# Patient Record
Sex: FEMALE | Race: WHITE | NOT HISPANIC OR LATINO | URBAN - METROPOLITAN AREA
[De-identification: names, ages, dates, MRNs, and addresses within clinical notes are randomized per-mention and may not be internally consistent; named-entity substitution may affect disease eponyms.]

---

## 2018-01-23 ENCOUNTER — OUTPATIENT (OUTPATIENT)
Dept: OUTPATIENT SERVICES | Facility: HOSPITAL | Age: 48
LOS: 1 days | Discharge: HOME | End: 2018-01-23

## 2018-01-23 DIAGNOSIS — Z01.818 ENCOUNTER FOR OTHER PREPROCEDURAL EXAMINATION: ICD-10-CM

## 2018-01-23 DIAGNOSIS — G56.02 CARPAL TUNNEL SYNDROME, LEFT UPPER LIMB: ICD-10-CM

## 2018-01-24 DIAGNOSIS — F17.200 NICOTINE DEPENDENCE, UNSPECIFIED, UNCOMPLICATED: ICD-10-CM

## 2018-01-24 DIAGNOSIS — I10 ESSENTIAL (PRIMARY) HYPERTENSION: ICD-10-CM

## 2018-01-24 DIAGNOSIS — E01.8 OTHER IODINE-DEFICIENCY RELATED THYROID DISORDERS AND ALLIED CONDITIONS: ICD-10-CM

## 2018-02-01 ENCOUNTER — OUTPATIENT (OUTPATIENT)
Dept: OUTPATIENT SERVICES | Facility: HOSPITAL | Age: 48
LOS: 1 days | Discharge: HOME | End: 2018-02-01

## 2018-02-07 DIAGNOSIS — Z91.040 LATEX ALLERGY STATUS: ICD-10-CM

## 2018-02-07 DIAGNOSIS — I10 ESSENTIAL (PRIMARY) HYPERTENSION: ICD-10-CM

## 2018-02-07 DIAGNOSIS — G56.02 CARPAL TUNNEL SYNDROME, LEFT UPPER LIMB: ICD-10-CM

## 2018-03-01 ENCOUNTER — OUTPATIENT (OUTPATIENT)
Dept: OUTPATIENT SERVICES | Facility: HOSPITAL | Age: 48
LOS: 1 days | Discharge: HOME | End: 2018-03-01

## 2018-03-01 VITALS
HEART RATE: 70 BPM | RESPIRATION RATE: 20 BRPM | DIASTOLIC BLOOD PRESSURE: 87 MMHG | OXYGEN SATURATION: 98 % | SYSTOLIC BLOOD PRESSURE: 146 MMHG

## 2018-03-01 VITALS
HEART RATE: 81 BPM | TEMPERATURE: 99 F | HEIGHT: 67 IN | DIASTOLIC BLOOD PRESSURE: 71 MMHG | RESPIRATION RATE: 18 BRPM | WEIGHT: 199.96 LBS | OXYGEN SATURATION: 99 % | SYSTOLIC BLOOD PRESSURE: 129 MMHG

## 2018-03-01 DIAGNOSIS — Z98.890 OTHER SPECIFIED POSTPROCEDURAL STATES: Chronic | ICD-10-CM

## 2018-03-01 DIAGNOSIS — Z90.49 ACQUIRED ABSENCE OF OTHER SPECIFIED PARTS OF DIGESTIVE TRACT: Chronic | ICD-10-CM

## 2018-03-01 RX ORDER — ONDANSETRON 8 MG/1
4 TABLET, FILM COATED ORAL ONCE
Qty: 0 | Refills: 0 | Status: DISCONTINUED | OUTPATIENT
Start: 2018-03-01 | End: 2018-03-16

## 2018-03-01 RX ORDER — ACETAMINOPHEN 500 MG
975 TABLET ORAL ONCE
Qty: 0 | Refills: 0 | Status: DISCONTINUED | OUTPATIENT
Start: 2018-03-01 | End: 2018-03-16

## 2018-03-01 RX ORDER — SODIUM CHLORIDE 9 MG/ML
1000 INJECTION, SOLUTION INTRAVENOUS
Qty: 0 | Refills: 0 | Status: DISCONTINUED | OUTPATIENT
Start: 2018-03-01 | End: 2018-03-16

## 2018-03-01 RX ORDER — OXYCODONE AND ACETAMINOPHEN 5; 325 MG/1; MG/1
1 TABLET ORAL EVERY 4 HOURS
Qty: 0 | Refills: 0 | Status: DISCONTINUED | OUTPATIENT
Start: 2018-03-01 | End: 2018-03-01

## 2018-03-01 RX ORDER — HYDROMORPHONE HYDROCHLORIDE 2 MG/ML
1 INJECTION INTRAMUSCULAR; INTRAVENOUS; SUBCUTANEOUS
Qty: 0 | Refills: 0 | Status: DISCONTINUED | OUTPATIENT
Start: 2018-03-01 | End: 2018-03-01

## 2018-03-01 RX ORDER — HYDROMORPHONE HYDROCHLORIDE 2 MG/ML
0.5 INJECTION INTRAMUSCULAR; INTRAVENOUS; SUBCUTANEOUS
Qty: 0 | Refills: 0 | Status: DISCONTINUED | OUTPATIENT
Start: 2018-03-01 | End: 2018-03-01

## 2018-03-01 RX ORDER — MEPERIDINE HYDROCHLORIDE 50 MG/ML
12.5 INJECTION INTRAMUSCULAR; INTRAVENOUS; SUBCUTANEOUS ONCE
Qty: 0 | Refills: 0 | Status: DISCONTINUED | OUTPATIENT
Start: 2018-03-01 | End: 2018-03-01

## 2018-03-01 RX ADMIN — SODIUM CHLORIDE 100 MILLILITER(S): 9 INJECTION, SOLUTION INTRAVENOUS at 14:14

## 2018-03-01 NOTE — ASU DISCHARGE PLAN (ADULT/PEDIATRIC). - SPECIAL INSTRUCTIONS
DIET:    Resume normal diet  No alcoholic  beverages for 24 hours or if on prescribed narcotic pain medications.    MEDICATION:    Resume your preoperative oral medications.  Check with your physician before starting aspirin, Coumadin, or other blood thinners.  Prescriptions given to you - take as directed.      ACTIVITY:    Rest today and limit your activities for 24 hours.  Do not drive or operate machinery for 24 hours - if you received anesthesia.  When taking pain medication, be careful as you walk or climb stairs.  It is not advisable to drive while taking prescribed pain medication.    SPECIAL INSTRUCTIONS:    __x___ Elevate operative site above heart level or as directed.  _____ Apply ice to operative site as directed.  _____ Use  sling as directed.  __x___ Exercise fingers.    DRESSING CARE:    _____ You may change the dressing 4 days. Keep wound covered with band-aids.  __x___ Do not change the dressing until your doctor see you.  __x___ You can loosen or rewrap the dressing.  __x___  Keep dressing clean and dry.  __x___ You may shower in _1____ day(s) with the extremity covered by a plastic bag.  _____ OK to wash hand , including showers, in _____ day(s).    ADDITIONAL  INFORMATION:    Post operative visit should be scheduled for next week.  If you are not aware of visit please contact office.  If you have any questions or concerns call office at      Notify your doctor if you develop   Fever  Excessive Swelling  Chills   Drainage   Pain not controlled by medication  Persistent numbness in hand or fingers    If an Emergency arises call 911 and/or go to the Emergency Room

## 2018-03-01 NOTE — BRIEF OPERATIVE NOTE - PROCEDURE
<<-----Click on this checkbox to enter Procedure Open release of right carpal tunnel  03/01/2018    Active  [id number]

## 2018-03-05 DIAGNOSIS — G56.01 CARPAL TUNNEL SYNDROME, RIGHT UPPER LIMB: ICD-10-CM

## 2018-05-22 ENCOUNTER — TRANSCRIPTION ENCOUNTER (OUTPATIENT)
Age: 48
End: 2018-05-22

## 2019-06-19 ENCOUNTER — EMERGENCY (EMERGENCY)
Facility: HOSPITAL | Age: 49
LOS: 0 days | Discharge: HOME | End: 2019-06-20
Attending: EMERGENCY MEDICINE | Admitting: EMERGENCY MEDICINE
Payer: COMMERCIAL

## 2019-06-19 VITALS
SYSTOLIC BLOOD PRESSURE: 141 MMHG | HEART RATE: 99 BPM | DIASTOLIC BLOOD PRESSURE: 94 MMHG | RESPIRATION RATE: 18 BRPM | OXYGEN SATURATION: 97 % | TEMPERATURE: 99 F

## 2019-06-19 DIAGNOSIS — Z98.890 OTHER SPECIFIED POSTPROCEDURAL STATES: Chronic | ICD-10-CM

## 2019-06-19 DIAGNOSIS — R42 DIZZINESS AND GIDDINESS: ICD-10-CM

## 2019-06-19 DIAGNOSIS — I10 ESSENTIAL (PRIMARY) HYPERTENSION: ICD-10-CM

## 2019-06-19 DIAGNOSIS — R07.9 CHEST PAIN, UNSPECIFIED: ICD-10-CM

## 2019-06-19 DIAGNOSIS — Z90.49 ACQUIRED ABSENCE OF OTHER SPECIFIED PARTS OF DIGESTIVE TRACT: Chronic | ICD-10-CM

## 2019-06-19 DIAGNOSIS — E78.5 HYPERLIPIDEMIA, UNSPECIFIED: ICD-10-CM

## 2019-06-19 DIAGNOSIS — R07.89 OTHER CHEST PAIN: ICD-10-CM

## 2019-06-19 DIAGNOSIS — Z87.891 PERSONAL HISTORY OF NICOTINE DEPENDENCE: ICD-10-CM

## 2019-06-19 DIAGNOSIS — Z82.49 FAMILY HISTORY OF ISCHEMIC HEART DISEASE AND OTHER DISEASES OF THE CIRCULATORY SYSTEM: ICD-10-CM

## 2019-06-19 LAB
ALBUMIN SERPL ELPH-MCNC: 4.1 G/DL — SIGNIFICANT CHANGE UP (ref 3.5–5.2)
ALP SERPL-CCNC: 98 U/L — SIGNIFICANT CHANGE UP (ref 30–115)
ALT FLD-CCNC: 10 U/L — SIGNIFICANT CHANGE UP (ref 0–41)
ANION GAP SERPL CALC-SCNC: 16 MMOL/L — HIGH (ref 7–14)
AST SERPL-CCNC: 16 U/L — SIGNIFICANT CHANGE UP (ref 0–41)
BASOPHILS # BLD AUTO: 0.04 K/UL — SIGNIFICANT CHANGE UP (ref 0–0.2)
BASOPHILS NFR BLD AUTO: 0.6 % — SIGNIFICANT CHANGE UP (ref 0–1)
BILIRUB SERPL-MCNC: <0.2 MG/DL — SIGNIFICANT CHANGE UP (ref 0.2–1.2)
BUN SERPL-MCNC: 15 MG/DL — SIGNIFICANT CHANGE UP (ref 10–20)
CALCIUM SERPL-MCNC: 9.3 MG/DL — SIGNIFICANT CHANGE UP (ref 8.5–10.1)
CHLORIDE SERPL-SCNC: 100 MMOL/L — SIGNIFICANT CHANGE UP (ref 98–110)
CO2 SERPL-SCNC: 22 MMOL/L — SIGNIFICANT CHANGE UP (ref 17–32)
CREAT SERPL-MCNC: 0.9 MG/DL — SIGNIFICANT CHANGE UP (ref 0.7–1.5)
D DIMER BLD IA.RAPID-MCNC: 401 NG/ML DDU — HIGH (ref 0–230)
EOSINOPHIL # BLD AUTO: 0.01 K/UL — SIGNIFICANT CHANGE UP (ref 0–0.7)
EOSINOPHIL NFR BLD AUTO: 0.1 % — SIGNIFICANT CHANGE UP (ref 0–8)
GLUCOSE SERPL-MCNC: 92 MG/DL — SIGNIFICANT CHANGE UP (ref 70–99)
HCT VFR BLD CALC: 40.9 % — SIGNIFICANT CHANGE UP (ref 37–47)
HGB BLD-MCNC: 13.5 G/DL — SIGNIFICANT CHANGE UP (ref 12–16)
IMM GRANULOCYTES NFR BLD AUTO: 0.3 % — SIGNIFICANT CHANGE UP (ref 0.1–0.3)
LYMPHOCYTES # BLD AUTO: 0.79 K/UL — LOW (ref 1.2–3.4)
LYMPHOCYTES # BLD AUTO: 11.7 % — LOW (ref 20.5–51.1)
MCHC RBC-ENTMCNC: 31.3 PG — HIGH (ref 27–31)
MCHC RBC-ENTMCNC: 33 G/DL — SIGNIFICANT CHANGE UP (ref 32–37)
MCV RBC AUTO: 94.7 FL — SIGNIFICANT CHANGE UP (ref 81–99)
MONOCYTES # BLD AUTO: 0.97 K/UL — HIGH (ref 0.1–0.6)
MONOCYTES NFR BLD AUTO: 14.3 % — HIGH (ref 1.7–9.3)
NEUTROPHILS # BLD AUTO: 4.94 K/UL — SIGNIFICANT CHANGE UP (ref 1.4–6.5)
NEUTROPHILS NFR BLD AUTO: 73 % — SIGNIFICANT CHANGE UP (ref 42.2–75.2)
NRBC # BLD: 0 /100 WBCS — SIGNIFICANT CHANGE UP (ref 0–0)
PLATELET # BLD AUTO: 257 K/UL — SIGNIFICANT CHANGE UP (ref 130–400)
POTASSIUM SERPL-MCNC: 4.4 MMOL/L — SIGNIFICANT CHANGE UP (ref 3.5–5)
POTASSIUM SERPL-SCNC: 4.4 MMOL/L — SIGNIFICANT CHANGE UP (ref 3.5–5)
PROT SERPL-MCNC: 7.2 G/DL — SIGNIFICANT CHANGE UP (ref 6–8)
RBC # BLD: 4.32 M/UL — SIGNIFICANT CHANGE UP (ref 4.2–5.4)
RBC # FLD: 13.6 % — SIGNIFICANT CHANGE UP (ref 11.5–14.5)
SODIUM SERPL-SCNC: 138 MMOL/L — SIGNIFICANT CHANGE UP (ref 135–146)
TROPONIN T SERPL-MCNC: <0.01 NG/ML — SIGNIFICANT CHANGE UP
WBC # BLD: 6.77 K/UL — SIGNIFICANT CHANGE UP (ref 4.8–10.8)
WBC # FLD AUTO: 6.77 K/UL — SIGNIFICANT CHANGE UP (ref 4.8–10.8)

## 2019-06-19 PROCEDURE — 71045 X-RAY EXAM CHEST 1 VIEW: CPT | Mod: 26

## 2019-06-19 PROCEDURE — 93010 ELECTROCARDIOGRAM REPORT: CPT

## 2019-06-19 PROCEDURE — 99220: CPT

## 2019-06-19 RX ORDER — ASPIRIN/CALCIUM CARB/MAGNESIUM 324 MG
162 TABLET ORAL ONCE
Refills: 0 | Status: COMPLETED | OUTPATIENT
Start: 2019-06-19 | End: 2019-06-19

## 2019-06-19 RX ADMIN — Medication 162 MILLIGRAM(S): at 21:25

## 2019-06-19 NOTE — ED PROVIDER NOTE - INPATIENT RESIDENT/ACP NOTIFIED DATE
----- Message from Lisa Weiner sent at 10/15/2018  8:07 AM CDT -----  Contact: Patient  Needs Advice    Reason for call: Patient has concerns about his low potassium levels. He wishes to speak to a nurse before going have his labs drawn about a possible 2nd drawing, while he's there.         Communication Preference: [phone]    Additional Information: n/a     19-Jun-2019 22:50

## 2019-06-19 NOTE — ED CDU PROVIDER INITIAL DAY NOTE - OBJECTIVE STATEMENT
48y/o female with pmh of htn, hld, pt. c/o lightheadedness and cp associated with sob. cp radiates to the neck on both sides. . pt. denies fever, chills, cough, vomiting, abdominal pain, leg pain/swelling, recent travel. pt. states she had mmr booster 10 days ago. last stress test was 3 years ago with dr. Yang. + fhx of cad. ex smoker- quit 1 year ago.

## 2019-06-19 NOTE — ED PROVIDER NOTE - OBJECTIVE STATEMENT
48 yo f with hypothyroidism, former dyslipidemia, and htn (no longer on meds per recent pmd visit), c/o cp since this afternoon, rad to neck, left back/shoulder.  assoc with sob and pleuritic cp. has chronic right leg pain due to knee surgery, but no swelling/calf or groin pain.  active smoker. +fam hx of cad (siblings with cabg in 50s.

## 2019-06-19 NOTE — ED CDU PROVIDER INITIAL DAY NOTE - NEURO NEGATIVE STATEMENT, MLM
no loss of consciousness, no gait abnormality, no headache, no sensory deficits, and no weakness. + lightheadedness

## 2019-06-19 NOTE — ED CDU PROVIDER INITIAL DAY NOTE - ATTENDING CONTRIBUTION TO CARE
50yo woman h/o HTN, HLD, smoker was placed in CDU for workup of chest discomfort into her neck. ED eval with EKG, labs and CTA were unremarkable. VS, exam as noted, pt extremely anxious and notes that she is under a lot of stress, had been started on SSRI with PMD but went off voluntarily because it didn't seem to helping. Plan is for Monitoring, serial EKG and enzymes, nuclear stress.

## 2019-06-19 NOTE — ED CDU PROVIDER INITIAL DAY NOTE - MEDICAL DECISION MAKING DETAILS
48yo woman h/o HTN, HLD, smoker was placed in CDU for workup of chest discomfort into her neck. ED eval with EKG, labs and CTA were unremarkable. VS, exam as noted, pt extremely anxious and notes that she is under a lot of stress, had been started on SSRI with PMD but went off voluntarily because it didn't seem to helping. Plan is for Monitoring, serial EKG and enzymes, nuclear stress.

## 2019-06-20 VITALS
RESPIRATION RATE: 18 BRPM | OXYGEN SATURATION: 98 % | TEMPERATURE: 98 F | DIASTOLIC BLOOD PRESSURE: 98 MMHG | HEART RATE: 80 BPM | SYSTOLIC BLOOD PRESSURE: 157 MMHG

## 2019-06-20 PROBLEM — E03.9 HYPOTHYROIDISM, UNSPECIFIED: Chronic | Status: ACTIVE | Noted: 2018-03-01

## 2019-06-20 LAB
HCG SERPL-ACNC: <0.6 MIU/ML — SIGNIFICANT CHANGE UP
TROPONIN T SERPL-MCNC: <0.01 NG/ML — SIGNIFICANT CHANGE UP

## 2019-06-20 PROCEDURE — 71275 CT ANGIOGRAPHY CHEST: CPT | Mod: 26

## 2019-06-20 PROCEDURE — 78452 HT MUSCLE IMAGE SPECT MULT: CPT | Mod: 26

## 2019-06-20 PROCEDURE — 99217: CPT

## 2019-06-20 PROCEDURE — 93018 CV STRESS TEST I&R ONLY: CPT

## 2019-06-20 PROCEDURE — 93016 CV STRESS TEST SUPVJ ONLY: CPT

## 2019-06-20 NOTE — ED ADULT NURSE REASSESSMENT NOTE - NS ED NURSE REASSESS COMMENT FT1
patient assessed vss. patient updated on plan understanding was verbalized. Patient states initial symptoms of cp have subsided and were not present overnight.

## 2019-06-20 NOTE — ED CDU PROVIDER DISPOSITION NOTE - CARE PROVIDER_API CALL
Jose Yang)  Cardiovascular Disease; Internal Medicine  [address] [place] [address]  Phone: [phone]  Fax: [phone]  Follow Up Time:

## 2019-06-20 NOTE — ED CDU PROVIDER SUBSEQUENT DAY NOTE - PROGRESS NOTE DETAILS
Patient with unremarkable nuclear stress. Discussed all imaging and labs with patient. Patient reports significant FHx of CAD, ex-smoker. Advised to f/u with cardio outpatient. Patient also notes increased stress load in personal life and has been attributing symptoms to such. Was previously on paxil but discontinued due to GI upset. Will provide pt with outpatient mental health

## 2019-06-20 NOTE — ED CDU PROVIDER SUBSEQUENT DAY NOTE - MEDICAL DECISION MAKING DETAILS
48yo woman h/o HTN, HLD, smoker was placed in CDU for workup of chest discomfort into her neck. ED eval with EKG, labs and CTA were unremarkable. VS, exam as noted, pt extremely anxious and notes that she is under a lot of stress, had been started on SSRI with PMD but went off voluntarily because it didn't seem to helping. Pt monitored without incident, repeat EKG and enzymes unchanged. Awaiting exercise nuc.

## 2019-06-20 NOTE — ED CDU PROVIDER DISPOSITION NOTE - ATTENDING CONTRIBUTION TO CARE
50yo woman h/o HTN, HLD, smoker was placed in CDU for workup of chest discomfort into her neck. ED eval with EKG, labs and CTA were unremarkable. VS, exam as noted, pt extremely anxious and notes that she is under a lot of stress, had been started on SSRI with PMD but went off voluntarily because it didn't seem to helping. Pt monitored without incident, repeat EKG and enzymes unchanged. Pt tolerated exercise nuc without difficulty; it was negative for ischemia. I spoke extensively with pt about her stress as I feel she is symptomatic from anxiety--however she does have a strong FH of CAD, rec f/u with Dr Yang and with outpt mental health services. Pt amenable to discharge and will f/u.

## 2019-06-20 NOTE — ED CDU PROVIDER DISPOSITION NOTE - CLINICAL COURSE
48yo woman h/o HTN, HLD, smoker was placed in CDU for workup of chest discomfort into her neck. ED eval with EKG, labs and CTA were unremarkable. VS, exam as noted, pt extremely anxious and notes that she is under a lot of stress, had been started on SSRI with PMD but went off voluntarily because it didn't seem to helping. Pt monitored without incident, repeat EKG and enzymes unchanged. Pt tolerated exercise nuc without difficulty; it was negative for ischemia. I spoke extensively with pt about her stress as I feel she is symptomatic from anxiety--however she does have a strong FH of CAD, rec f/u with Dr Yang and with outpt mental health services. Pt amenable to discharge and will f/u.

## 2019-06-20 NOTE — ED CDU PROVIDER DISPOSITION NOTE - NSFOLLOWUPCLINICS_GEN_ALL_ED_FT
[facility]  OP Mental Health  [address] [place] [id number]  Phone: [phone]  Fax:   Follow Up Time:

## 2019-09-11 ENCOUNTER — TRANSCRIPTION ENCOUNTER (OUTPATIENT)
Age: 49
End: 2019-09-11

## 2019-11-08 NOTE — ASU PATIENT PROFILE, ADULT - CIGARETTES, NUMBER OF YRS
Yarsani/ethnic/cultural/personal food preferences/acute on chronic comorbidities, ESRD on HD acute on chronic comorbidities, ESRD on HD/Mosque/ethnic/cultural/personal food preferences/developmental delay 25

## 2020-09-10 ENCOUNTER — TRANSCRIPTION ENCOUNTER (OUTPATIENT)
Age: 50
End: 2020-09-10

## 2020-10-02 ENCOUNTER — EMERGENCY (EMERGENCY)
Facility: HOSPITAL | Age: 50
LOS: 0 days | Discharge: HOME | End: 2020-10-03
Attending: EMERGENCY MEDICINE | Admitting: EMERGENCY MEDICINE
Payer: COMMERCIAL

## 2020-10-02 ENCOUNTER — TRANSCRIPTION ENCOUNTER (OUTPATIENT)
Age: 50
End: 2020-10-02

## 2020-10-02 VITALS
OXYGEN SATURATION: 96 % | HEART RATE: 74 BPM | RESPIRATION RATE: 16 BRPM | TEMPERATURE: 98 F | DIASTOLIC BLOOD PRESSURE: 98 MMHG | WEIGHT: 220.02 LBS | HEIGHT: 67 IN | SYSTOLIC BLOOD PRESSURE: 174 MMHG

## 2020-10-02 DIAGNOSIS — H57.89 OTHER SPECIFIED DISORDERS OF EYE AND ADNEXA: ICD-10-CM

## 2020-10-02 DIAGNOSIS — Z88.1 ALLERGY STATUS TO OTHER ANTIBIOTIC AGENTS STATUS: ICD-10-CM

## 2020-10-02 DIAGNOSIS — Z98.890 OTHER SPECIFIED POSTPROCEDURAL STATES: Chronic | ICD-10-CM

## 2020-10-02 DIAGNOSIS — Z79.899 OTHER LONG TERM (CURRENT) DRUG THERAPY: ICD-10-CM

## 2020-10-02 DIAGNOSIS — I10 ESSENTIAL (PRIMARY) HYPERTENSION: ICD-10-CM

## 2020-10-02 DIAGNOSIS — E03.9 HYPOTHYROIDISM, UNSPECIFIED: ICD-10-CM

## 2020-10-02 DIAGNOSIS — R51.9 HEADACHE, UNSPECIFIED: ICD-10-CM

## 2020-10-02 DIAGNOSIS — Z90.49 ACQUIRED ABSENCE OF OTHER SPECIFIED PARTS OF DIGESTIVE TRACT: Chronic | ICD-10-CM

## 2020-10-02 DIAGNOSIS — Z79.891 LONG TERM (CURRENT) USE OF OPIATE ANALGESIC: ICD-10-CM

## 2020-10-02 LAB
ANION GAP SERPL CALC-SCNC: 12 MMOL/L — SIGNIFICANT CHANGE UP (ref 7–14)
BASOPHILS # BLD AUTO: 0.07 K/UL — SIGNIFICANT CHANGE UP (ref 0–0.2)
BASOPHILS NFR BLD AUTO: 0.4 % — SIGNIFICANT CHANGE UP (ref 0–1)
BUN SERPL-MCNC: 16 MG/DL — SIGNIFICANT CHANGE UP (ref 10–20)
CALCIUM SERPL-MCNC: 9.2 MG/DL — SIGNIFICANT CHANGE UP (ref 8.5–10.1)
CHLORIDE SERPL-SCNC: 106 MMOL/L — SIGNIFICANT CHANGE UP (ref 98–110)
CO2 SERPL-SCNC: 24 MMOL/L — SIGNIFICANT CHANGE UP (ref 17–32)
CREAT SERPL-MCNC: 1 MG/DL — SIGNIFICANT CHANGE UP (ref 0.7–1.5)
EOSINOPHIL # BLD AUTO: 0.13 K/UL — SIGNIFICANT CHANGE UP (ref 0–0.7)
EOSINOPHIL NFR BLD AUTO: 0.8 % — SIGNIFICANT CHANGE UP (ref 0–8)
GLUCOSE SERPL-MCNC: 124 MG/DL — HIGH (ref 70–99)
HCG SERPL QL: NEGATIVE — SIGNIFICANT CHANGE UP
HCT VFR BLD CALC: 40.7 % — SIGNIFICANT CHANGE UP (ref 37–47)
HGB BLD-MCNC: 13.2 G/DL — SIGNIFICANT CHANGE UP (ref 12–16)
IMM GRANULOCYTES NFR BLD AUTO: 0.4 % — HIGH (ref 0.1–0.3)
LYMPHOCYTES # BLD AUTO: 15.3 % — LOW (ref 20.5–51.1)
LYMPHOCYTES # BLD AUTO: 2.43 K/UL — SIGNIFICANT CHANGE UP (ref 1.2–3.4)
MCHC RBC-ENTMCNC: 29.9 PG — SIGNIFICANT CHANGE UP (ref 27–31)
MCHC RBC-ENTMCNC: 32.4 G/DL — SIGNIFICANT CHANGE UP (ref 32–37)
MCV RBC AUTO: 92.3 FL — SIGNIFICANT CHANGE UP (ref 81–99)
MONOCYTES # BLD AUTO: 1.21 K/UL — HIGH (ref 0.1–0.6)
MONOCYTES NFR BLD AUTO: 7.6 % — SIGNIFICANT CHANGE UP (ref 1.7–9.3)
NEUTROPHILS # BLD AUTO: 12.01 K/UL — HIGH (ref 1.4–6.5)
NEUTROPHILS NFR BLD AUTO: 75.5 % — HIGH (ref 42.2–75.2)
NRBC # BLD: 0 /100 WBCS — SIGNIFICANT CHANGE UP (ref 0–0)
PLATELET # BLD AUTO: 312 K/UL — SIGNIFICANT CHANGE UP (ref 130–400)
POTASSIUM SERPL-MCNC: 4.1 MMOL/L — SIGNIFICANT CHANGE UP (ref 3.5–5)
POTASSIUM SERPL-SCNC: 4.1 MMOL/L — SIGNIFICANT CHANGE UP (ref 3.5–5)
RBC # BLD: 4.41 M/UL — SIGNIFICANT CHANGE UP (ref 4.2–5.4)
RBC # FLD: 12.4 % — SIGNIFICANT CHANGE UP (ref 11.5–14.5)
SODIUM SERPL-SCNC: 142 MMOL/L — SIGNIFICANT CHANGE UP (ref 135–146)
WBC # BLD: 15.92 K/UL — HIGH (ref 4.8–10.8)
WBC # FLD AUTO: 15.92 K/UL — HIGH (ref 4.8–10.8)

## 2020-10-02 PROCEDURE — 99285 EMERGENCY DEPT VISIT HI MDM: CPT

## 2020-10-02 RX ORDER — SODIUM CHLORIDE 9 MG/ML
1000 INJECTION, SOLUTION INTRAVENOUS ONCE
Refills: 0 | Status: COMPLETED | OUTPATIENT
Start: 2020-10-02 | End: 2020-10-02

## 2020-10-02 RX ORDER — ACETAMINOPHEN 500 MG
650 TABLET ORAL ONCE
Refills: 0 | Status: COMPLETED | OUTPATIENT
Start: 2020-10-02 | End: 2020-10-02

## 2020-10-02 RX ORDER — METOCLOPRAMIDE HCL 10 MG
10 TABLET ORAL ONCE
Refills: 0 | Status: COMPLETED | OUTPATIENT
Start: 2020-10-02 | End: 2020-10-02

## 2020-10-02 RX ADMIN — SODIUM CHLORIDE 1000 MILLILITER(S): 9 INJECTION, SOLUTION INTRAVENOUS at 23:06

## 2020-10-02 RX ADMIN — Medication 650 MILLIGRAM(S): at 23:05

## 2020-10-02 RX ADMIN — Medication 104 MILLIGRAM(S): at 23:05

## 2020-10-02 NOTE — ED ADULT NURSE NOTE - CHPI ED NUR SYMPTOMS NEG
no chills/no dizziness/no fever/no tingling/no nausea/no weakness/no decreased eating/drinking/no vomiting

## 2020-10-02 NOTE — ED ADULT TRIAGE NOTE - CHIEF COMPLAINT QUOTE
pt sent to ED from urgent care, pt states she has had a constant headache x3 days. today pt noticed a "popped blood vessel" to her left eye. pt states the headache has resolved in triage

## 2020-10-02 NOTE — ED ADULT NURSE NOTE - OBJECTIVE STATEMENT
patient complaints of headache x 3 days and redness to the left eye. Patient denies n/v, chest pain, and no SOB.

## 2020-10-03 PROCEDURE — 70450 CT HEAD/BRAIN W/O DYE: CPT | Mod: 26

## 2020-10-03 NOTE — ED PROVIDER NOTE - OBJECTIVE STATEMENT
pt presents to ED reporting HA intermittently for months, but daily for 1 week. noticed red spot to eye today, went to  who referred pt to ED for further eval. admits on augmentin for sinusitis dx via telemed visit 2 days ago. pain is sharp, nonradiating, moderate. denies exacerbating or relieving factors. Denies fever/chill/dizziness/chest pain/palpitation/sob/abd pain/n/v/d/ black stool/bloody stool/urinary sxs

## 2020-10-03 NOTE — ED PROVIDER NOTE - ATTENDING CONTRIBUTION TO CARE
I personally evaluated the patient. I reviewed the Resident’s or Physician Assistant’s note (as assigned above), and agree with the findings and plan except as documented in my note.  50 F with hx of hypothyroidism on Synthroid, with complaints of headache for the past 5 days, located behind both eyes and radiates to the rest of the head. Pt reports hx of intermittent headache but it has been constant for the past 5 dys. Had a televist several days ago and was started on Amoxicillin for presumed sinusitis. Pt noted a bright red spot on her left eye prompting visit to urgent care and they referred him to the ED. Denies fever, no visual complaints. No lightheadedness or LOC. VS reviewed, pt non-toxic appearing, NAD. Head ncat, PERRLA, EOMI, visual acuity as per MLP exam, MMM, neck supple, normal ROM, normal s1s2 without any murmurs, Lungs CTAB with normal work of breathing. abd +BS, s/nd/nt, extremities wnl, AAO x 3, CN II to XII wnl, normal motor, sensation, cerebellar and gait exams. No acute skin rashes. Plan is labs, pain control, imaging and reassess.

## 2020-10-03 NOTE — ED PROVIDER NOTE - CLINICAL SUMMARY MEDICAL DECISION MAKING FREE TEXT BOX
Pt presented to the ED with headache. Required labs, imaging, meds. No acute findings. Will d/c with neurology and ENT f/up.

## 2020-10-03 NOTE — ED PROVIDER NOTE - NSFOLLOWUPCLINICS_GEN_ALL_ED_FT
[facility]  Neurology  [address], [address]  [place] [address]  Phone: [phone]  Fax:   Follow Up Time:     [facility]  ENT  [address], [address]nd [place] [address]  Phone: [phone]  Fax:   Follow Up Time:

## 2020-10-03 NOTE — ED PROVIDER NOTE - PATIENT PORTAL LINK FT
You can access the FollowMyHealth Patient Portal offered by [facility] by registering at the following website: [email or web address]. By joining [facility]’s FollowMyHealth portal, you will also be able to view your health information using other applications (apps) compatible with our system.

## 2020-10-03 NOTE — ED PROVIDER NOTE - PHYSICAL EXAMINATION
CONSTITUTIONAL: Well-appearing; well-nourished; in no apparent distress.   EYES: PERRL; EOM intact. acuity 20/30 right, left and together; small left subconjunctival hemorrhage  CARDIOVASCULAR: Normal S1, S2; no murmurs, rubs, or gallops.   RESPIRATORY: Normal chest excursion with respiration; breath sounds clear and equal bilaterally; no wheezes, rhonchi, or rales.  SKIN: Normal for age and race; warm; dry; good turgor; no apparent lesions or exudate.   NEURO/PSYCH: no drifting. strength equal to b/l upper and lower extremities. speaking coherently. nml cerebellum test. ambulate with nml and steady gait. no nuchal rigidity. negative Kernig’s and Brudzinski’s signs  A & O x 4; grossly unremarkable. mood and manner are appropriate. Grooming and personal hygiene are appropriate. No apparent thoughts of harm to self or others.

## 2020-10-03 NOTE — ED PROVIDER NOTE - NS ED ROS FT
Constitutional: no fever, chills, no recent weight loss, change in appetite or malaise  Eyes: no discharge/pain/vision changes  ENT: no rhinorrhea/ear pain/sore throat  Cardiac: No chest pain, SOB or edema.  Respiratory: No cough or respiratory distress  GI: No nausea, vomiting, diarrhea or abdominal pain.  : No dysuria, frequency, urgency or hematuria  MS: no pain to back or extremities, no loss of ROM, no weakness  Neuro: No weakness. No LOC.  Skin: No skin rash.  Endocrine: No history of thyroid disease or diabetes.  Except as documented in the HPI, all other systems are negative.

## 2020-10-06 ENCOUNTER — APPOINTMENT (OUTPATIENT)
Dept: NEUROLOGY | Facility: CLINIC | Age: 50
End: 2020-10-06
Payer: COMMERCIAL

## 2020-10-06 VITALS
HEIGHT: 67 IN | WEIGHT: 220 LBS | TEMPERATURE: 98.4 F | BODY MASS INDEX: 34.53 KG/M2 | HEART RATE: 80 BPM | DIASTOLIC BLOOD PRESSURE: 95 MMHG | SYSTOLIC BLOOD PRESSURE: 138 MMHG | OXYGEN SATURATION: 97 %

## 2020-10-06 DIAGNOSIS — G43.001 MIGRAINE W/OUT AURA, NOT INTRACTABLE, WITH STATUS MIGRAINOSUS: ICD-10-CM

## 2020-10-06 DIAGNOSIS — K31.89 OTHER DISEASES OF STOMACH AND DUODENUM: ICD-10-CM

## 2020-10-06 DIAGNOSIS — Z84.89 FAMILY HISTORY OF OTHER SPECIFIED CONDITIONS: ICD-10-CM

## 2020-10-06 PROCEDURE — 99203 OFFICE O/P NEW LOW 30 MIN: CPT

## 2020-10-06 RX ORDER — PROMETHAZINE HYDROCHLORIDE 25 MG/1
25 TABLET ORAL 3 TIMES DAILY
Qty: 30 | Refills: 5 | Status: ACTIVE | COMMUNITY
Start: 2020-10-06 | End: 1900-01-01

## 2020-10-06 RX ORDER — LEVOTHYROXINE SODIUM 175 UG/1
175 TABLET ORAL
Refills: 0 | Status: ACTIVE | COMMUNITY

## 2020-10-06 RX ORDER — ASPIRIN 81 MG
81 TABLET,CHEWABLE ORAL
Refills: 0 | Status: ACTIVE | COMMUNITY

## 2020-10-06 RX ORDER — SUMATRIPTAN 100 MG/1
100 TABLET, FILM COATED ORAL
Qty: 12 | Refills: 3 | Status: ACTIVE | COMMUNITY
Start: 2020-10-06 | End: 1900-01-01

## 2020-10-06 NOTE — HISTORY OF PRESENT ILLNESS
[FreeTextEntry1] : Pt is 51 yo LH female sent for evaluation of headaches.  States always had perimenstrual headaches and stress and allergy headaches but recently more frequent.  Thought maybe it was from allergies, then more pressure and dizziness.  Has pending surgery for torn meniscus.  PCP thought maybe sinus infection at that time most of pressure behind right eye and given an antibiotic then headaches haven't stopped since before the abx.  States it is getting better then intensifies.  Has been taking aleve at first then after ER visit started excedrin migraine 3 days ago.  Takes about 6 tablets a day.  Gets temporary relief then returns.\par \par "Lots of pressure behind eyes".  It affects vision when it happens.  Pressure buidls from frontal region to top then to back of head then nape of neck to whole head.  Can feel like band squeezing then throbbing as intensifies.  Gets photophobia and phonophobia (usually when headache intensity is severe).  Movement hurts headaches and dizziness (lightheaded feeling, and a little spinning when pressure builds.  No sparklers or wavey lines.  No symptoms in extremities.  Current duration of headache is 10 days.  States had CT scan of head and negative.  Told there was some fluid in sinuses.  Sees ENT tomorrow at 9 am.  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  Periodically here and there would miss work due to migraine and took aleve or advil.  Hasn't tried sumatriptan in past only nabumetone but that caused burning in her stomach.

## 2020-10-06 NOTE — REVIEW OF SYSTEMS
[Feeling Poorly] : feeling poorly [Feeling Tired] : feeling tired [Confused or Disoriented] : confusion [Memory Lapses or Loss] : memory loss [Decr. Concentrating Ability] : decreased concentrating ability [Dizziness] : dizziness [Lightheadedness] : lightheadedness [Vertigo] : vertigo [Migraine Headache] : migraine headaches [Tension Headache] : tension-type headaches [Sleep Disturbances] : sleep disturbances [Anxiety] : anxiety [Depression] : depression [Eye Pain] : eye pain [Eyesight Problems] : eyesight problems [Palpitations] : palpitations [Heartburn] : heartburn [Joint Pain] : joint pain [Joint Swelling] : joint swelling [Joint Stiffness] : joint stiffness [Skin Lesions] : skin lesion [Hot Flashes] : hot flashes [Easy Bruising] : a tendency for easy bruising [Fever] : no fever [Chills] : no chills [Recent Weight Gain (___ Lbs)] : no recent weight gain [Recent Weight Loss (___ Lbs)] : no recent weight loss [Difficulty with Language] : no ~M difficulty with language [Facial Weakness] : no facial weakness [Arm Weakness] : no arm weakness [Hand Weakness] : no hand weakness [Leg Weakness] : no leg weakness [Poor Coordination] : good coordination [Numbness] : no numbness [Tingling] : no tingling [Seizures] : no convulsions [Fainting] : no fainting [Cluster Headache] : no cluster headache [Difficulty Walking] : no difficulty walking [Inability to Walk] : able to walk [Ataxia] : no ataxia [Frequent Falls] : not falling [Suicidal] : not suicidal [Earache] : no earache [Loss Of Hearing] : no hearing loss [Heart Rate Is Slow] : the heart rate was not slow [Chest Pain] : no chest pain [Shortness Of Breath] : no shortness of breath [Wheezing] : no wheezing [Cough] : no cough [Abdominal Pain] : no abdominal pain [Vomiting] : no vomiting [Constipation] : no constipation [Diarrhea] : no diarrhea [Melena] : no melena [Dysuria] : no dysuria [Incontinence] : no incontinence [Skin Wound] : no skin wound [Muscle Weakness] : no muscle weakness [Easy Bleeding] : no tendency for easy bleeding [de-identified] : snoring and headaches, sleepy during day [FreeTextEntry9] : hands nad pain in right knee [de-identified] : skin small brown spots on forearm

## 2020-10-06 NOTE — PHYSICAL EXAM
[FreeTextEntry1] : Recent and remote memory, general fund of knowledge, attention, concentration, and language function were intact. ATP, [id number]. Pupils are equal, round and reactive to light and accommodation.  Funduscopic exam did not show any papilledema.  Visual fields are intact to confrontation.  Extraocular movements are full.  There is no nystagmus.  The facial muscles are symmetric.  Facial sensation is intact to light touch, temperature, and pinprick.  Hearing is intact to finger rub bilaterally.  Tongue is midline.  Palate elevates symmetrically.  Neck is supple.  There is no meningismus.  Shoulder shrugs are symmetric.  Motor exam demonstrates 5/5 strength in the proximal and distal muscles of the upper and lower extremities.  Tone and bulk were normal.  There is no pronator drift.  Reflexes are 2+ bilaterally in the biceps, triceps, brachioradialis, patellae and ankles.  Toes are downgoing.  There is no clonus.  Coordination demonstrates normal finger-to-nose, heel-to-shin and rapid alternating movements.  Gait demonstrates normal heel and toe walk.  Tandem gait is normal.  Sensory exam, normal light touch, pinprick, vibration sensation in upper and lower extremities. \par \par The patient is well-developed, well-nourished female in no acute distress.  Cardiac exam demonstrates a regular rate and rhythm.  No murmurs.  Carotids are 2+ bilaterally.  No bruits.  Abdomen is soft, nontender, and nondistended.  Bowel sounds are present.  Extremities showed no clubbing, cyanosis or edema. \par \par \par

## 2020-10-06 NOTE — ASSESSMENT
[FreeTextEntry1] : Migraine headache with status migrainosis.  Potential contributing factors include genetics, hormonal changes during menses, stress, sleep, possible sinus infection, medication overuse component.\par \par Recommend:\par 1.  Sumatriptan 100 mg, may repeat once in two hours.  Use no more than twice in a day and no more than 2 days in a week.\par 2.  Phenergan 25 mg po q8h prn for nausea.\par 3.  Keep ENT appt.  If no significant infection and headache fails to improve with sumatriptan and phenergan then will give steroid taper and consider valproic acid 750 mg q8h for a couple of days.\par 4.  Minimize caffeine.\par 5.  Return in 3 months.

## 2020-10-07 ENCOUNTER — APPOINTMENT (OUTPATIENT)
Dept: OTOLARYNGOLOGY | Facility: CLINIC | Age: 50
End: 2020-10-07
Payer: COMMERCIAL

## 2020-10-07 VITALS — WEIGHT: 220 LBS | BODY MASS INDEX: 34.53 KG/M2 | HEIGHT: 67 IN

## 2020-10-07 PROCEDURE — 31231 NASAL ENDOSCOPY DX: CPT

## 2020-10-07 PROCEDURE — 99204 OFFICE O/P NEW MOD 45 MIN: CPT | Mod: 25

## 2020-10-07 RX ORDER — FLUTICASONE PROPIONATE 50 UG/1
50 SPRAY, METERED NASAL
Qty: 16 | Refills: 0 | Status: ACTIVE | COMMUNITY
Start: 2020-09-29

## 2020-10-07 NOTE — PHYSICAL EXAM
[Nasal Endoscopy Performed] : nasal endoscopy was performed, see procedure section for findings [Midline] : trachea located in midline position [Normal] : no rashes [FreeTextEntry1] : Left conjunctival hematoma

## 2020-10-07 NOTE — PROCEDURE
[Recalcitrant Symptoms] : recalcitrant symptoms  [None] : none [Flexible Endoscope] : examined with the flexible endoscope [Congested] : congested [Alvin] : alvin [Normal] : the paranasal sinuses had no abnormalities

## 2020-10-07 NOTE — HISTORY OF PRESENT ILLNESS
[de-identified] : 10/07/2020 Patient presents today with sinusitis and headaches. Patient admits 2 weeks ago started having migraines more frequently. About 1 month ago  the nasal pressure started. 1 week ago she has a funny taste. She then called PCP Dr Hutchinson he put her on amoxicillin which she finished yesterday. She is still experiencing combination of headache and migraines. She went to the ER on 10/03/2020 for burning in nasal passage rupture blood vessels in left eye and severe headache

## 2020-10-07 NOTE — ASSESSMENT
[FreeTextEntry1] : Pt will continue with flonase and nasal saline. Pt will start when cleared by ortho.

## 2020-10-19 ENCOUNTER — RX RENEWAL (OUTPATIENT)
Age: 50
End: 2020-10-19

## 2020-11-04 ENCOUNTER — APPOINTMENT (OUTPATIENT)
Dept: OTOLARYNGOLOGY | Facility: CLINIC | Age: 50
End: 2020-11-04

## 2020-12-15 ENCOUNTER — APPOINTMENT (OUTPATIENT)
Dept: CARDIOLOGY | Facility: CLINIC | Age: 50
End: 2020-12-15
Payer: COMMERCIAL

## 2020-12-15 ENCOUNTER — OUTPATIENT (OUTPATIENT)
Dept: OUTPATIENT SERVICES | Facility: HOSPITAL | Age: 50
LOS: 1 days | Discharge: HOME | End: 2020-12-15
Payer: COMMERCIAL

## 2020-12-15 VITALS
WEIGHT: 220 LBS | TEMPERATURE: 97.6 F | HEART RATE: 72 BPM | BODY MASS INDEX: 34.53 KG/M2 | HEIGHT: 67 IN | DIASTOLIC BLOOD PRESSURE: 82 MMHG | SYSTOLIC BLOOD PRESSURE: 132 MMHG

## 2020-12-15 DIAGNOSIS — Z12.31 ENCOUNTER FOR SCREENING MAMMOGRAM FOR MALIGNANT NEOPLASM OF BREAST: ICD-10-CM

## 2020-12-15 DIAGNOSIS — Z90.49 ACQUIRED ABSENCE OF OTHER SPECIFIED PARTS OF DIGESTIVE TRACT: Chronic | ICD-10-CM

## 2020-12-15 DIAGNOSIS — E03.9 HYPOTHYROIDISM, UNSPECIFIED: ICD-10-CM

## 2020-12-15 DIAGNOSIS — Z98.890 OTHER SPECIFIED POSTPROCEDURAL STATES: Chronic | ICD-10-CM

## 2020-12-15 DIAGNOSIS — E78.5 HYPERLIPIDEMIA, UNSPECIFIED: ICD-10-CM

## 2020-12-15 PROCEDURE — 77063 BREAST TOMOSYNTHESIS BI: CPT | Mod: 26

## 2020-12-15 PROCEDURE — 93000 ELECTROCARDIOGRAM COMPLETE: CPT

## 2020-12-15 PROCEDURE — 99072 ADDL SUPL MATRL&STAF TM PHE: CPT

## 2020-12-15 PROCEDURE — 77067 SCR MAMMO BI INCL CAD: CPT | Mod: 26

## 2020-12-15 PROCEDURE — 99204 OFFICE O/P NEW MOD 45 MIN: CPT

## 2020-12-15 RX ORDER — MULTIVIT/FOLIC ACID/HERBAL 223 400 MCG
TABLET ORAL
Refills: 0 | Status: ACTIVE | COMMUNITY

## 2020-12-15 RX ORDER — ATORVASTATIN CALCIUM 10 MG/1
10 TABLET, FILM COATED ORAL DAILY
Qty: 90 | Refills: 3 | Status: ACTIVE | COMMUNITY
Start: 2020-12-15 | End: 1900-01-01

## 2020-12-15 RX ORDER — METOPROLOL TARTRATE 25 MG/1
25 TABLET, FILM COATED ORAL
Qty: 1 | Refills: 0 | Status: ACTIVE | COMMUNITY
Start: 2020-12-15 | End: 1900-01-01

## 2020-12-15 NOTE — HISTORY OF PRESENT ILLNESS
[FreeTextEntry1] : The patient has a family history of early CAD , increased cholesterol which is not being treated , HTN no longer on medication, former smoker , who has had SALAZAR and flush like feeling intermittent . She has had chronic migraine headaches . She has some discomfort in her chest extending to her neck.  The patient had an EGD . This improved apparently from PPI and recurred again after stopping . At time when she walks she feels a tightness in her chest and she feels her heart is coming out of her chest .

## 2020-12-15 NOTE — PHYSICAL EXAM
[General Appearance - Well Developed] : well developed [Normal Appearance] : normal appearance [Well Groomed] : well groomed [General Appearance - Well Nourished] : well nourished [No Deformities] : no deformities [General Appearance - In No Acute Distress] : no acute distress [Normal Conjunctiva] : the conjunctiva exhibited no abnormalities [Eyelids - No Xanthelasma] : the eyelids demonstrated no xanthelasmas [Normal Oral Mucosa] : normal oral mucosa [No Oral Pallor] : no oral pallor [No Oral Cyanosis] : no oral cyanosis [Normal Rate] : normal [Rhythm Regular] : regular [No Murmur] : no murmurs heard [1+] : left 1+ [No Pitting Edema] : no pitting edema present [Respiration, Rhythm And Depth] : normal respiratory rhythm and effort [Exaggerated Use Of Accessory Muscles For Inspiration] : no accessory muscle use [Auscultation Breath Sounds / Voice Sounds] : lungs were clear to auscultation bilaterally [Abdomen Soft] : soft [Abdomen Tenderness] : non-tender [Abdomen Mass (___ Cm)] : no abdominal mass palpated [Abnormal Walk] : normal gait [Gait - Sufficient For Exercise Testing] : the gait was sufficient for exercise testing [Skin Color & Pigmentation] : normal skin color and pigmentation [] : no rash [No Venous Stasis] : no venous stasis [Skin Lesions] : no skin lesions [No Skin Ulcers] : no skin ulcer [No Xanthoma] : no  xanthoma was observed [FreeTextEntry1] : No JVD

## 2020-12-15 NOTE — ASSESSMENT
[FreeTextEntry1] : The patient has had chest pain on exertion and SOB . She has an extensive family history of premature CAD . She ha pre DM and increased cholesterol which is not being treated . She may have OS clinically as well.

## 2020-12-15 NOTE — REASON FOR VISIT
[Consultation] : a consultation regarding [Hyperlipidemia] : hyperlipidemia [Hypertension] : hypertension [FreeTextEntry1] : risks for CAD .

## 2020-12-15 NOTE — REVIEW OF SYSTEMS
[Headache] : headache [Feeling Fatigued] : feeling fatigued [Shortness Of Breath] : shortness of breath [Chest Pain] : chest pain [Cough] : cough [Heartburn] : heartburn [Joint Pain] : joint pain [Joint Swelling] : joint swelling [Negative] : Endocrine [Joint Stiffness] : no joint stiffness

## 2020-12-18 ENCOUNTER — EMERGENCY (EMERGENCY)
Facility: HOSPITAL | Age: 50
LOS: 0 days | Discharge: HOME | End: 2020-12-19
Attending: EMERGENCY MEDICINE | Admitting: EMERGENCY MEDICINE
Payer: COMMERCIAL

## 2020-12-18 VITALS
HEART RATE: 130 BPM | SYSTOLIC BLOOD PRESSURE: 181 MMHG | HEIGHT: 67 IN | RESPIRATION RATE: 18 BRPM | TEMPERATURE: 99 F | DIASTOLIC BLOOD PRESSURE: 121 MMHG | OXYGEN SATURATION: 100 %

## 2020-12-18 DIAGNOSIS — I10 ESSENTIAL (PRIMARY) HYPERTENSION: ICD-10-CM

## 2020-12-18 DIAGNOSIS — Z90.49 ACQUIRED ABSENCE OF OTHER SPECIFIED PARTS OF DIGESTIVE TRACT: ICD-10-CM

## 2020-12-18 DIAGNOSIS — Z98.890 OTHER SPECIFIED POSTPROCEDURAL STATES: Chronic | ICD-10-CM

## 2020-12-18 DIAGNOSIS — Z87.891 PERSONAL HISTORY OF NICOTINE DEPENDENCE: ICD-10-CM

## 2020-12-18 DIAGNOSIS — E03.9 HYPOTHYROIDISM, UNSPECIFIED: ICD-10-CM

## 2020-12-18 DIAGNOSIS — R07.89 OTHER CHEST PAIN: ICD-10-CM

## 2020-12-18 DIAGNOSIS — Z98.890 OTHER SPECIFIED POSTPROCEDURAL STATES: ICD-10-CM

## 2020-12-18 DIAGNOSIS — Z90.49 ACQUIRED ABSENCE OF OTHER SPECIFIED PARTS OF DIGESTIVE TRACT: Chronic | ICD-10-CM

## 2020-12-18 DIAGNOSIS — R07.9 CHEST PAIN, UNSPECIFIED: ICD-10-CM

## 2020-12-18 DIAGNOSIS — Z20.828 CONTACT WITH AND (SUSPECTED) EXPOSURE TO OTHER VIRAL COMMUNICABLE DISEASES: ICD-10-CM

## 2020-12-18 DIAGNOSIS — E78.5 HYPERLIPIDEMIA, UNSPECIFIED: ICD-10-CM

## 2020-12-18 LAB
ALBUMIN SERPL ELPH-MCNC: 4.8 G/DL — SIGNIFICANT CHANGE UP (ref 3.5–5.2)
ALP SERPL-CCNC: 102 U/L — SIGNIFICANT CHANGE UP (ref 30–115)
ALT FLD-CCNC: 16 U/L — SIGNIFICANT CHANGE UP (ref 0–41)
ANION GAP SERPL CALC-SCNC: 14 MMOL/L — SIGNIFICANT CHANGE UP (ref 7–14)
APTT BLD: 33.6 SEC — SIGNIFICANT CHANGE UP (ref 27–39.2)
AST SERPL-CCNC: 15 U/L — SIGNIFICANT CHANGE UP (ref 0–41)
BASOPHILS # BLD AUTO: 0.05 K/UL — SIGNIFICANT CHANGE UP (ref 0–0.2)
BASOPHILS NFR BLD AUTO: 0.4 % — SIGNIFICANT CHANGE UP (ref 0–1)
BILIRUB SERPL-MCNC: 0.3 MG/DL — SIGNIFICANT CHANGE UP (ref 0.2–1.2)
BUN SERPL-MCNC: 13 MG/DL — SIGNIFICANT CHANGE UP (ref 10–20)
CALCIUM SERPL-MCNC: 9.5 MG/DL — SIGNIFICANT CHANGE UP (ref 8.5–10.1)
CHLORIDE SERPL-SCNC: 100 MMOL/L — SIGNIFICANT CHANGE UP (ref 98–110)
CO2 SERPL-SCNC: 22 MMOL/L — SIGNIFICANT CHANGE UP (ref 17–32)
CREAT SERPL-MCNC: 0.9 MG/DL — SIGNIFICANT CHANGE UP (ref 0.7–1.5)
D DIMER BLD IA.RAPID-MCNC: 118 NG/ML DDU — SIGNIFICANT CHANGE UP (ref 0–230)
EOSINOPHIL # BLD AUTO: 0.13 K/UL — SIGNIFICANT CHANGE UP (ref 0–0.7)
EOSINOPHIL NFR BLD AUTO: 1 % — SIGNIFICANT CHANGE UP (ref 0–8)
GLUCOSE SERPL-MCNC: 113 MG/DL — HIGH (ref 70–99)
HCG SERPL QL: NEGATIVE — SIGNIFICANT CHANGE UP
HCT VFR BLD CALC: 40.2 % — SIGNIFICANT CHANGE UP (ref 37–47)
HGB BLD-MCNC: 13.2 G/DL — SIGNIFICANT CHANGE UP (ref 12–16)
IMM GRANULOCYTES NFR BLD AUTO: 0.3 % — SIGNIFICANT CHANGE UP (ref 0.1–0.3)
INR BLD: 1.04 RATIO — SIGNIFICANT CHANGE UP (ref 0.65–1.3)
LYMPHOCYTES # BLD AUTO: 27.3 % — SIGNIFICANT CHANGE UP (ref 20.5–51.1)
LYMPHOCYTES # BLD AUTO: 3.54 K/UL — HIGH (ref 1.2–3.4)
MAGNESIUM SERPL-MCNC: 2.1 MG/DL — SIGNIFICANT CHANGE UP (ref 1.8–2.4)
MCHC RBC-ENTMCNC: 30.1 PG — SIGNIFICANT CHANGE UP (ref 27–31)
MCHC RBC-ENTMCNC: 32.8 G/DL — SIGNIFICANT CHANGE UP (ref 32–37)
MCV RBC AUTO: 91.8 FL — SIGNIFICANT CHANGE UP (ref 81–99)
MONOCYTES # BLD AUTO: 0.94 K/UL — HIGH (ref 0.1–0.6)
MONOCYTES NFR BLD AUTO: 7.3 % — SIGNIFICANT CHANGE UP (ref 1.7–9.3)
NEUTROPHILS # BLD AUTO: 8.26 K/UL — HIGH (ref 1.4–6.5)
NEUTROPHILS NFR BLD AUTO: 63.7 % — SIGNIFICANT CHANGE UP (ref 42.2–75.2)
NRBC # BLD: 0 /100 WBCS — SIGNIFICANT CHANGE UP (ref 0–0)
NT-PROBNP SERPL-SCNC: 42 PG/ML — SIGNIFICANT CHANGE UP (ref 0–300)
PLATELET # BLD AUTO: 435 K/UL — HIGH (ref 130–400)
POTASSIUM SERPL-MCNC: 4 MMOL/L — SIGNIFICANT CHANGE UP (ref 3.5–5)
POTASSIUM SERPL-SCNC: 4 MMOL/L — SIGNIFICANT CHANGE UP (ref 3.5–5)
PROT SERPL-MCNC: 7.4 G/DL — SIGNIFICANT CHANGE UP (ref 6–8)
PROTHROM AB SERPL-ACNC: 12 SEC — SIGNIFICANT CHANGE UP (ref 9.95–12.87)
RBC # BLD: 4.38 M/UL — SIGNIFICANT CHANGE UP (ref 4.2–5.4)
RBC # FLD: 12.4 % — SIGNIFICANT CHANGE UP (ref 11.5–14.5)
SODIUM SERPL-SCNC: 136 MMOL/L — SIGNIFICANT CHANGE UP (ref 135–146)
TROPONIN T SERPL-MCNC: <0.01 NG/ML — SIGNIFICANT CHANGE UP
WBC # BLD: 12.96 K/UL — HIGH (ref 4.8–10.8)
WBC # FLD AUTO: 12.96 K/UL — HIGH (ref 4.8–10.8)

## 2020-12-18 PROCEDURE — 99220: CPT

## 2020-12-18 PROCEDURE — 71046 X-RAY EXAM CHEST 2 VIEWS: CPT | Mod: 26

## 2020-12-18 PROCEDURE — 93010 ELECTROCARDIOGRAM REPORT: CPT | Mod: 76

## 2020-12-18 RX ORDER — ASPIRIN/CALCIUM CARB/MAGNESIUM 324 MG
325 TABLET ORAL ONCE
Refills: 0 | Status: COMPLETED | OUTPATIENT
Start: 2020-12-18 | End: 2020-12-18

## 2020-12-18 RX ORDER — CARVEDILOL PHOSPHATE 80 MG/1
6.25 CAPSULE, EXTENDED RELEASE ORAL ONCE
Refills: 0 | Status: COMPLETED | OUTPATIENT
Start: 2020-12-18 | End: 2020-12-18

## 2020-12-18 RX ADMIN — Medication 325 MILLIGRAM(S): at 21:05

## 2020-12-18 NOTE — ED PROVIDER NOTE - PROGRESS NOTE DETAILS
SC: Spoke with cardiology fellow who recommends obs for ccta. Patient to be placed into observation status. There is a lack of diagnostic certainty, where a more precise diagnosis could decide inpatient admission or discharge to home, and/or need for therapeutic intensity, where extensive therapy has a reasonable possibility of abating the patient's presenting condition, and thereby prevents inpatient admission.

## 2020-12-18 NOTE — ED PROVIDER NOTE - OBJECTIVE STATEMENT
50F with pmh of HTN not on meds, hypothyroidism, HLD presents with moderate bloating epigastric pain since January, worsening over the past week, associated with epigastric pain radiating to R neck pain today. Denies n/v/d, SOB, fevers, LE pain or swelling, OCPs, travel, palpitations. +FHx of early MI. Former smoker.  Cards: Liyah 50F with pmh of HTN not on meds, hypothyroidism, HLD presents with moderate bloating epigastric pain since January, worsening over the past week, associated with epigastric pain radiating to R neck pain today. Denies n/v/d, SOB, fevers, LE pain or swelling, OCPs, travel, palpitations. +FHx of early MI. Former smoker. Recent negative nuc stress 2/19.  Cards: Liyah

## 2020-12-18 NOTE — ED ADULT NURSE NOTE - OBJECTIVE STATEMENT
Pt c/o intermittent episodes of midsternal chest/epigastric pain for the past few weeks, worse when she lays down/after eating. Pt states pain got worse today than usual, she hasn't been eating because of it and now has pain that radiates up the left side of her neck. Pt also c/o high BP lately, not on meds since she quit smoking a few years ago as BP was WDL. Pt denies SOB/difficulty breathing.

## 2020-12-18 NOTE — ED CDU PROVIDER INITIAL DAY NOTE - MEDICAL DECISION MAKING DETAILS
51yo woman h/o hypothyroidism, HTN, HLD was placed in CDU for workup of epigastric/chest pain intermittent over the last couple of weeks. ED workup was unremarkable. VS, exam as noted. Plan is for telemetry, serial EKG/enzymes, CCTA.

## 2020-12-18 NOTE — ED PROVIDER NOTE - ATTENDING CONTRIBUTION TO CARE
Patient is c/o chest pain, intermittent episodes, associated with fullness sensation in the epigastric area/sob/palpitations. patient states that, had been to cardiologist, was recommended CCTA, was waiting for insurance approval, here c/o worsening symptoms. patient denies n/v/change in PO intake, Denies HA/dizziness/presyncope/syncope. Denies risk factors for PE/DVT; denies lower ext pain/swelling.   Vitals reviewed.   Lungs: CTA, no crackles  Heart: s1, s2, rrr, no M/G  abd: +BS, NT, ND, soft  Ext: no E/E/C: pulse+  no calf tenderness  CNS: awake, alert, o x 3, no focal neurologic deficits  A/P: Chest pain  labs, EKG, CXR  reevaluation.

## 2020-12-18 NOTE — ED CDU PROVIDER INITIAL DAY NOTE - ATTENDING CONTRIBUTION TO CARE
49yo woman h/o hypothyroidism, HTN, HLD was placed in CDU for workup of epigastric/chest pain intermittent over the last couple of weeks. ED workup was unremarkable. VS, exam as noted. Plan is for telemetry, serial EKG/enzymes, CCTA.

## 2020-12-18 NOTE — ED CDU PROVIDER INITIAL DAY NOTE - OBJECTIVE STATEMENT
49y/o female with pmh of hypothyroidism, htn, hld, pt. c/o epigatsric/mid sternal cp for several weeks. today pt. had a left sided neck pain. + sob and mild cough. pt. saw her cardiologist dr. Yang 3 days ago and dr. Yang wants to do a coronary ct. pt.'s siblings had cabg at the age of 50 and 47.

## 2020-12-18 NOTE — ED ADULT NURSE REASSESSMENT NOTE - NS ED NURSE REASSESS COMMENT FT1
pt had crackers and juice states she feels the same, still c/o same chest pain midsternal/epigastric.

## 2020-12-18 NOTE — ED PROVIDER NOTE - NS ED ROS FT
Constitutional: (-) diaphoresis  Eyes/ENT: (-) runny nose  Cardiovascular: (+) chest pain  Respiratory: (-) cough  Gastrointestinal: (-) vomiting, (-) diarrhea  : (-) dysuria   Musculoskeletal: (-) joint pain  Integumentary: (-) rash  Neurological: (-) LOC  Allergic/Immunologic: (-) pruritus

## 2020-12-18 NOTE — ED CDU PROVIDER INITIAL DAY NOTE - FAMILY HISTORY
Sibling  Still living? Unknown  Family history of coronary artery bypass graft, Age at diagnosis: 51-60

## 2020-12-18 NOTE — CONSULT NOTE ADULT - ASSESSMENT
IMPRESSION:    Atypical chest pain   SALAZAR   DL  HTN     Recommendations:   Admit to obs   CE x2, serial EKGs   CCTA in AM   c/w ASA 81 mg daily and lipitor 20 mg daily   Monitor BP, if remains elevated add coreg 6.25 q12h   Protonix 40 mg daily  IMPRESSION:    Atypical chest pain   SALAZAR   DL  HTN

## 2020-12-18 NOTE — CONSULT NOTE ADULT - ATTENDING COMMENTS
Seen / examined and above reviewed.    Atypical chest pain.  Hemodynamics stable / hypertensive (improved)  EKG: NSR  Ruled-out MI.    Plan for CCTA today.  ASA  Monitor BP.

## 2020-12-18 NOTE — ED PROVIDER NOTE - PHYSICAL EXAMINATION
CONSTITUTIONAL: in no acute distress.   SKIN: warm, dry  HEAD: Normocephalic.  EYES: PERRL.  ENT: Airway clear.  NECK: Supple.  LYMPH: No acute cervical adenopathy.  CARD: Regular rate and rhythm.   RESP: No wheezing, rales or rhonchi.  ABD: soft ntnd  EXT: No clubbing, cyanosis. No LE pain or swelling.  NEURO: Alert, oriented.  PSYCH: Cooperative, appropriate.

## 2020-12-18 NOTE — ED PROVIDER NOTE - CLINICAL SUMMARY MEDICAL DECISION MAKING FREE TEXT BOX
Patient remained stable in ED, labs/CXR/EKG findings reviewed and discussed with patient. Patient was evaluated by cardiology fellow and recommended admission to ED OBSERVATION Unit for further evaluation of patient symptoms. Discussed with EDOU/OBS provider and patient is admitted to EDOU for further evaluation of her symptoms.

## 2020-12-18 NOTE — CONSULT NOTE ADULT - SUBJECTIVE AND OBJECTIVE BOX
Date of Admission: 12/18/2020    CHIEF COMPLAINT: Epigastric pain     HISTORY OF PRESENT ILLNESS:     51 yo female with strong family hx presenting for epigastric pain for 2-3 weeks duration, radiating to the chest, burning, associated with left neck pain.  She was evaluated by Dr Yang few days ago, who recommended CCTA and work-up for pheo.       PAST MEDICAL & SURGICAL HISTORY:  Hypothyroid    Hypertension    S/P cholecystectomy    History of hand surgery        FAMILY HISTORY:  [ ] no pertinent family history of premature cardiovascular disease in first degree relatives.  Mother: Esophageal Ca   Father: MI 60's  Siblings: MI and CABG~40's     SOCIAL HISTORY:    [x] Non-smoker. ~25 PY, quit 2 years ago   [ ] Smoker  [ ] Alcohol    Allergies    erythromycin (Vomiting; Diarrhea)    Intolerances    	    REVIEW OF SYSTEMS:  CONSTITUTIONAL: denies fever, weight loss, or fatigue  CARDIOLOGY: see HPI   RESPIRATORY: denies shortness of breath, wheezing.   NEUROLOGICAL: denies weakness, no focal deficits to report.  ENDOCRINOLOGICAL: no recent change in diabetic medications.   GI: no BRBPR, no N,V, diarrhea.    PSYCHIATRY: normal mood and affect  HEENT: no nasal discharge, no ecchymosis  SKIN: no ecchymosis, no breakdown  MUSCULOSKELETAL: Full range of motion x4.     PHYSICAL EXAM:  T(C): 37.1 (12-18-20 @ 18:50), Max: 37.1 (12-18-20 @ 18:50)  HR: 98 (12-18-20 @ 19:30) (98 - 130)  BP: 183/108 (12-18-20 @ 19:30) (181/121 - 187/114)  RR: 20 (12-18-20 @ 19:30) (18 - 20)  SpO2: 99% (12-18-20 @ 19:30) (99% - 100%)  Wt(kg): --  I&O's Summary      General Appearance: well appearing, normal for age and gender. 	  Neck: normal JVP, no bruit.   Eyes: No xanthomalasia, Extra Ocular muscles intact.   Cardiovascular: regular rate and rhythm S1 S2, No JVD, No murmurs, No edema  Respiratory: Lungs clear to auscultation	  Psychiatry: Alert and oriented x 3, Mood & affect appropriate  Gastrointestinal:  Soft, Non-tender  Skin/Integumen: No rashes, No ecchymoses, No cyanosis	  Neurologic: Non-focal  Musculoskeletal/extremities: Normal range of motion, No clubbing, cyanosis or edema  Vascular: Peripheral pulses palpable 2+ bilaterally    LABS:	 	                          13.2   12.96 )-----------( 435      ( 18 Dec 2020 19:55 )             40.2     12-18    136  |  100  |  13  ----------------------------<  113<H>  4.0   |  22  |  0.9    Ca    9.5      18 Dec 2020 19:55  Mg     2.1     12-18    TPro  7.4  /  Alb  4.8  /  TBili  0.3  /  DBili  x   /  AST  15  /  ALT  16  /  AlkPhos  102  12-18    CARDIAC MARKERS ( 18 Dec 2020 19:55 )  x     / <0.01 ng/mL / x     / x     / x          PT/INR - ( 18 Dec 2020 19:55 )   PT: 12.00 sec;   INR: 1.04 ratio         PTT - ( 18 Dec 2020 19:55 )  PTT:33.6 sec      TELEMETRY EVENTS: 	  Sinus rhythm   ECG:  	Sinus tachycardia, no ST-T changes   RADIOLOGY:  OTHER: 	    PREVIOUS DIAGNOSTIC TESTING:    [ ] Stress Test:  	  < from: NM Nuclear Stress Multiple (06.20.19 @ 11:47) >  Impression:   1. NORMAL STRESS AND REST MYOCARDIAL PERFUSION SPECT TOMOGRAPHY, WITH NO   EVIDENCE FOR ISCHEMIA AT THE LEVEL OF EXERCISE ATTAINED.   2. NORMAL RESTING LEFT VENTRICULAR WALL MOTION AND WALL THICKENING.  3. LEFT VENTRICULAR EJECTION FRACTION OF 65 % WHICH IS WITHIN RANGE OF   NORMAL.     < end of copied text >    	    Home Medications:  Multiple Vitamins oral tablet: 1 tab(s) orally once a day (01 Mar 2018 11:31)  Synthroid 200 mcg (0.2 mg) oral tablet: 1 tab(s) orally once a day (01 Mar 2018 11:31)    MEDICATIONS  (STANDING):    MEDICATIONS  (PRN):

## 2020-12-19 VITALS
SYSTOLIC BLOOD PRESSURE: 147 MMHG | HEART RATE: 72 BPM | RESPIRATION RATE: 18 BRPM | OXYGEN SATURATION: 99 % | TEMPERATURE: 98 F | DIASTOLIC BLOOD PRESSURE: 90 MMHG

## 2020-12-19 LAB
SARS-COV-2 RNA SPEC QL NAA+PROBE: SIGNIFICANT CHANGE UP
TROPONIN T SERPL-MCNC: <0.01 NG/ML — SIGNIFICANT CHANGE UP

## 2020-12-19 PROCEDURE — 99217: CPT

## 2020-12-19 PROCEDURE — 75574 CT ANGIO HRT W/3D IMAGE: CPT | Mod: 26

## 2020-12-19 PROCEDURE — 99222 1ST HOSP IP/OBS MODERATE 55: CPT

## 2020-12-19 RX ORDER — METOPROLOL TARTRATE 50 MG
100 TABLET ORAL ONCE
Refills: 0 | Status: COMPLETED | OUTPATIENT
Start: 2020-12-19 | End: 2020-12-19

## 2020-12-19 RX ORDER — METOPROLOL TARTRATE 50 MG
50 TABLET ORAL ONCE
Refills: 0 | Status: COMPLETED | OUTPATIENT
Start: 2020-12-19 | End: 2020-12-19

## 2020-12-19 RX ADMIN — Medication 100 MILLIGRAM(S): at 12:31

## 2020-12-19 RX ADMIN — Medication 50 MILLIGRAM(S): at 06:54

## 2020-12-19 NOTE — ED CDU PROVIDER SUBSEQUENT DAY NOTE - ATTENDING CONTRIBUTION TO CARE
49yo woman h/o hypothyroidism, HTN, HLD was placed in CDU for workup of epigastric/chest pain intermittent over the last couple of weeks. ED workup was unremarkable. VS, exam as noted. Pt was monitored without incident; repeat EKG and enzymes unchanged. Plan is for CCTA.

## 2020-12-19 NOTE — ED CDU PROVIDER DISPOSITION NOTE - PROVIDER TOKENS
PROVIDER:[TOKEN:[[id number]],FOLLOWUP:[4-6 Days]],PROVIDER:[TOKEN:[[id number]],FOLLOWUP:[4-6 Days]]

## 2020-12-19 NOTE — ED CDU PROVIDER DISPOSITION NOTE - NSFOLLOWUPINSTRUCTIONS_ED_ALL_ED_FT
Follow up with your PMD - Dr Hutchinson in 1 week  Follow up with your cardiologist - Dr Yang in 1-2 weeks  Continue all your home medicatios  Return to the ED if your symptoms worsen/return

## 2020-12-19 NOTE — ED ADULT NURSE REASSESSMENT NOTE - NS ED NURSE REASSESS COMMENT FT1
Pt received from main ED. Alert oriented and able to make needs known. Pt. denies any complaints of chest pain at current time. Trops negative x 2. Scheduled for a CCTA to be done. Will continue to monitor.

## 2020-12-19 NOTE — ED CDU PROVIDER DISPOSITION NOTE - ATTENDING CONTRIBUTION TO CARE
49yo woman h/o hypothyroidism, HTN, HLD was placed in CDU for workup of epigastric/chest pain intermittent over the last couple of weeks. ED workup was unremarkable. VS, exam as noted. Pt was monitored without incident; repeat EKG and enzymes unchanged. CCTA was CAD-RAD 0. Pt was givenc opies of worklup and d/c to f/u with Dr Yang and with PMD.

## 2020-12-19 NOTE — ED CDU PROVIDER DISPOSITION NOTE - PATIENT PORTAL LINK FT
You can access the FollowMyHealth Patient Portal offered by [facility] by registering at the following website: [email or web address]. By joining [facility]’s FollowMyHealth portal, you will also be able to view your health information using other applications (apps) compatible with our system.

## 2020-12-19 NOTE — ED CDU PROVIDER DISPOSITION NOTE - CARE PROVIDER_API CALL
Quentin Hutchinson  Infectious Disease  [address] [place] [address]  Phone: [phone]  Fax: [phone]  Follow Up Time: 4-6 Days    Jose Yang)  Cardiovascular Disease; Internal Medicine  [address] [place] [address]  Phone: [phone]  Fax: [phone]  Follow Up Time: 4-6 Days

## 2020-12-19 NOTE — ED ADULT NURSE REASSESSMENT NOTE - NS ED NURSE REASSESS COMMENT FT1
Pt assessed A/O times 4 Vs stable on cardiac monitor denies chest pain no SOB no N/V, no dizziness or N/V ambulate steady ,pt is seen evaluate by Ed attending with order for CTa waiting for the test ,on going nursing observation .

## 2020-12-19 NOTE — ED CDU PROVIDER SUBSEQUENT DAY NOTE - MEDICAL DECISION MAKING DETAILS
51yo woman h/o hypothyroidism, HTN, HLD was placed in CDU for workup of epigastric/chest pain intermittent over the last couple of weeks. ED workup was unremarkable. VS, exam as noted. Pt was monitored without incident; repeat EKG and enzymes unchanged. Plan is for CCTA.

## 2020-12-19 NOTE — ED CDU PROVIDER DISPOSITION NOTE - CLINICAL COURSE
51yo woman h/o hypothyroidism, HTN, HLD was placed in CDU for workup of epigastric/chest pain intermittent over the last couple of weeks. ED workup was unremarkable. VS, exam as noted. Pt was monitored without incident; repeat EKG and enzymes unchanged. CCTA was CAD-RAD 0. Pt was givenc opies of worklup and d/c to f/u with Dr Yang and with PMD.

## 2020-12-31 ENCOUNTER — APPOINTMENT (OUTPATIENT)
Dept: OTOLARYNGOLOGY | Facility: CLINIC | Age: 50
End: 2020-12-31
Payer: COMMERCIAL

## 2020-12-31 ENCOUNTER — APPOINTMENT (OUTPATIENT)
Dept: CARDIOLOGY | Facility: CLINIC | Age: 50
End: 2020-12-31
Payer: COMMERCIAL

## 2020-12-31 VITALS — TEMPERATURE: 98 F

## 2020-12-31 DIAGNOSIS — Z91.89 OTHER SPECIFIED PERSONAL RISK FACTORS, NOT ELSEWHERE CLASSIFIED: ICD-10-CM

## 2020-12-31 DIAGNOSIS — R07.9 CHEST PAIN, UNSPECIFIED: ICD-10-CM

## 2020-12-31 DIAGNOSIS — I10 ESSENTIAL (PRIMARY) HYPERTENSION: ICD-10-CM

## 2020-12-31 DIAGNOSIS — R44.8 OTHER SYMPTOMS AND SIGNS INVOLVING GENERAL SENSATIONS AND PERCEPTIONS: ICD-10-CM

## 2020-12-31 DIAGNOSIS — K21.9 GASTRO-ESOPHAGEAL REFLUX DISEASE W/OUT ESOPHAGITIS: ICD-10-CM

## 2020-12-31 PROCEDURE — 99072 ADDL SUPL MATRL&STAF TM PHE: CPT

## 2020-12-31 PROCEDURE — 93306 TTE W/DOPPLER COMPLETE: CPT

## 2020-12-31 PROCEDURE — 31231 NASAL ENDOSCOPY DX: CPT

## 2020-12-31 PROCEDURE — 99214 OFFICE O/P EST MOD 30 MIN: CPT | Mod: 25

## 2020-12-31 RX ORDER — CEFUROXIME AXETIL 500 MG/1
500 TABLET ORAL
Qty: 20 | Refills: 0 | Status: ACTIVE | COMMUNITY
Start: 2020-12-31 | End: 1900-01-01

## 2020-12-31 NOTE — HISTORY OF PRESENT ILLNESS
[FreeTextEntry1] : Patient presents today c/o Sinuitis / headaches . Symptom's have been getting worse. Has been seeing other specialist, will need collaborative  treatment .  She still producing excessive mucus. Her migraines are less frequent , but still having head pressure. Pt  was in ER with malignant hypertension. Pt has cough and nasal congestion and clear discharge but sometimes green. Pt had some improvement on abx. Pt was put on additional nasal spray,

## 2020-12-31 NOTE — PROCEDURE
[Recalcitrant Symptoms] : recalcitrant symptoms  [Flexible Endoscope] : examined with the flexible endoscope [Congested] : congested [Nasal Mucosa] : bilateral purulence [Normal] : the paranasal sinuses had no abnormalities

## 2021-01-05 ENCOUNTER — OUTPATIENT (OUTPATIENT)
Dept: OUTPATIENT SERVICES | Facility: HOSPITAL | Age: 51
LOS: 1 days | Discharge: HOME | End: 2021-01-05
Payer: COMMERCIAL

## 2021-01-05 DIAGNOSIS — R92.8 OTHER ABNORMAL AND INCONCLUSIVE FINDINGS ON DIAGNOSTIC IMAGING OF BREAST: ICD-10-CM

## 2021-01-05 DIAGNOSIS — Z98.890 OTHER SPECIFIED POSTPROCEDURAL STATES: Chronic | ICD-10-CM

## 2021-01-05 DIAGNOSIS — Z90.49 ACQUIRED ABSENCE OF OTHER SPECIFIED PARTS OF DIGESTIVE TRACT: Chronic | ICD-10-CM

## 2021-01-05 PROBLEM — R07.9 CHEST PAIN: Status: ACTIVE | Noted: 2020-12-15

## 2021-01-05 PROBLEM — I10 HYPERTENSION: Status: ACTIVE | Noted: 2020-12-15

## 2021-01-05 PROBLEM — Z91.89 MULTIPLE RISK FACTORS FOR CORONARY ARTERY DISEASE: Status: ACTIVE | Noted: 2020-12-15

## 2021-01-05 PROCEDURE — G0279: CPT | Mod: 26

## 2021-01-05 PROCEDURE — 77065 DX MAMMO INCL CAD UNI: CPT | Mod: 26,RT

## 2021-01-05 PROCEDURE — 76642 ULTRASOUND BREAST LIMITED: CPT | Mod: 26,RT

## 2021-01-12 ENCOUNTER — RESULT REVIEW (OUTPATIENT)
Age: 51
End: 2021-01-12

## 2021-01-12 ENCOUNTER — OUTPATIENT (OUTPATIENT)
Dept: OUTPATIENT SERVICES | Facility: HOSPITAL | Age: 51
LOS: 1 days | Discharge: HOME | End: 2021-01-12
Payer: COMMERCIAL

## 2021-01-12 DIAGNOSIS — Z90.49 ACQUIRED ABSENCE OF OTHER SPECIFIED PARTS OF DIGESTIVE TRACT: Chronic | ICD-10-CM

## 2021-01-12 DIAGNOSIS — Z98.890 OTHER SPECIFIED POSTPROCEDURAL STATES: Chronic | ICD-10-CM

## 2021-01-12 PROCEDURE — 19081 BX BREAST 1ST LESION STRTCTC: CPT | Mod: RT

## 2021-01-12 PROCEDURE — 88305 TISSUE EXAM BY PATHOLOGIST: CPT | Mod: 26

## 2021-01-13 LAB — SURGICAL PATHOLOGY STUDY: SIGNIFICANT CHANGE UP

## 2021-01-14 ENCOUNTER — OUTPATIENT (OUTPATIENT)
Dept: OUTPATIENT SERVICES | Facility: HOSPITAL | Age: 51
LOS: 1 days | Discharge: HOME | End: 2021-01-14
Payer: COMMERCIAL

## 2021-01-14 DIAGNOSIS — E04.1 NONTOXIC SINGLE THYROID NODULE: ICD-10-CM

## 2021-01-14 DIAGNOSIS — Z98.890 OTHER SPECIFIED POSTPROCEDURAL STATES: Chronic | ICD-10-CM

## 2021-01-14 DIAGNOSIS — Z90.49 ACQUIRED ABSENCE OF OTHER SPECIFIED PARTS OF DIGESTIVE TRACT: Chronic | ICD-10-CM

## 2021-01-14 DIAGNOSIS — J32.9 CHRONIC SINUSITIS, UNSPECIFIED: ICD-10-CM

## 2021-01-14 DIAGNOSIS — R44.8 OTHER SYMPTOMS AND SIGNS INVOLVING GENERAL SENSATIONS AND PERCEPTIONS: ICD-10-CM

## 2021-01-14 DIAGNOSIS — R10.13 EPIGASTRIC PAIN: ICD-10-CM

## 2021-01-14 PROCEDURE — 76700 US EXAM ABDOM COMPLETE: CPT | Mod: 26

## 2021-01-14 PROCEDURE — 76536 US EXAM OF HEAD AND NECK: CPT | Mod: 26

## 2021-01-14 PROCEDURE — 70486 CT MAXILLOFACIAL W/O DYE: CPT | Mod: 26

## 2021-01-19 DIAGNOSIS — N63.10 UNSPECIFIED LUMP IN THE RIGHT BREAST, UNSPECIFIED QUADRANT: ICD-10-CM

## 2021-01-19 DIAGNOSIS — N62 HYPERTROPHY OF BREAST: ICD-10-CM

## 2021-01-25 ENCOUNTER — APPOINTMENT (OUTPATIENT)
Dept: OTOLARYNGOLOGY | Facility: CLINIC | Age: 51
End: 2021-01-25

## 2021-02-22 ENCOUNTER — FORM ENCOUNTER (OUTPATIENT)
Age: 51
End: 2021-02-22

## 2021-02-23 ENCOUNTER — APPOINTMENT (OUTPATIENT)
Dept: OTOLARYNGOLOGY | Facility: CLINIC | Age: 51
End: 2021-02-23

## 2021-03-03 ENCOUNTER — FORM ENCOUNTER (OUTPATIENT)
Age: 51
End: 2021-03-03

## 2021-03-03 DIAGNOSIS — Z83.3 FAMILY HISTORY OF DIABETES MELLITUS: ICD-10-CM

## 2021-03-03 DIAGNOSIS — Z87.09 PERSONAL HISTORY OF OTHER DISEASES OF THE RESPIRATORY SYSTEM: ICD-10-CM

## 2021-03-03 DIAGNOSIS — J45.909 UNSPECIFIED ASTHMA, UNCOMPLICATED: ICD-10-CM

## 2021-03-03 DIAGNOSIS — R06.02 SHORTNESS OF BREATH: ICD-10-CM

## 2021-03-03 DIAGNOSIS — G47.33 OBSTRUCTIVE SLEEP APNEA (ADULT) (PEDIATRIC): ICD-10-CM

## 2021-03-03 RX ORDER — BUDESONIDE 90 UG/1
90 AEROSOL, POWDER RESPIRATORY (INHALATION)
Refills: 0 | Status: ACTIVE | COMMUNITY

## 2021-03-03 RX ORDER — ALBUTEROL SULFATE 90 UG/1
108 (90 BASE) AEROSOL, METERED RESPIRATORY (INHALATION)
Refills: 0 | Status: ACTIVE | COMMUNITY

## 2021-03-03 RX ORDER — FLUTICASONE FUROATE AND VILANTEROL TRIFENATATE 100; 25 UG/1; UG/1
100-25 POWDER RESPIRATORY (INHALATION)
Refills: 0 | Status: ACTIVE | COMMUNITY

## 2021-03-03 RX ORDER — ALBUTEROL SULFATE 90 UG/1
108 (90 BASE) POWDER, METERED RESPIRATORY (INHALATION)
Refills: 0 | Status: ACTIVE | COMMUNITY

## 2021-03-03 RX ORDER — LEVOTHYROXINE SODIUM 0.17 MG/1
175 TABLET ORAL
Refills: 0 | Status: ACTIVE | COMMUNITY

## 2021-03-04 ENCOUNTER — APPOINTMENT (OUTPATIENT)
Dept: OTOLARYNGOLOGY | Facility: CLINIC | Age: 51
End: 2021-03-04
Payer: COMMERCIAL

## 2021-03-04 DIAGNOSIS — K21.9 GASTRO-ESOPHAGEAL REFLUX DISEASE W/OUT ESOPHAGITIS: ICD-10-CM

## 2021-03-04 PROCEDURE — 99213 OFFICE O/P EST LOW 20 MIN: CPT | Mod: 25

## 2021-03-04 PROCEDURE — 99072 ADDL SUPL MATRL&STAF TM PHE: CPT

## 2021-03-04 PROCEDURE — 31231 NASAL ENDOSCOPY DX: CPT

## 2021-03-04 NOTE — ASSESSMENT
[FreeTextEntry1] : I have reviewed the risk and benefits of maxillary antrostomy and turbinate reduction and nasal swelling with the patient. They include but are not limited to bleeding, infection, recurrent symptoms and return to OR, change in taste and smell, injury to the orbit and brain, CSF leak, and need for postoperative treatment. \par \par

## 2021-03-04 NOTE — DATA REVIEWED
[de-identified] : CT Sinuses (1/14/21): \par 1. Mild maxillary sinus mucosal thickening. Otherwise, no significant sinonasal inflammatory changes at this time.\par \par 2. 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 Mild sigmoidal deviation of the osseous nasal septum with a 5 mm left posterior septal spur impinging the left nasal cavity.

## 2021-03-04 NOTE — HISTORY OF PRESENT ILLNESS
[FreeTextEntry1] : Patient presents today following up on chronic sinusitis, facial pressure. Patient admits symptoms have been okay but the last few days started having facial pressure. Clear mucus but black spots in it. Patient has changed her diet cutting out gluten and egg which she notices a big improvement. Patient had CT scan- results reviewed.

## 2021-03-04 NOTE — REASON FOR VISIT
[Subsequent Evaluation] : a subsequent evaluation for [FreeTextEntry2] : chronic sinusitis, facial pressure

## 2021-03-04 NOTE — PROCEDURE
[None] : none [Flexible Endoscope] : examined with the flexible endoscope [Congested] : congested [Normal] : the paranasal sinuses had no abnormalities

## 2021-03-18 ENCOUNTER — APPOINTMENT (OUTPATIENT)
Dept: PULMONOLOGY | Facility: CLINIC | Age: 51
End: 2021-03-18

## 2021-04-06 RX ORDER — AMOXICILLIN 875 MG/1
875 TABLET, FILM COATED ORAL
Qty: 14 | Refills: 0 | Status: ACTIVE | COMMUNITY
Start: 2021-04-06 | End: 1900-01-01

## 2021-04-06 RX ORDER — PREDNISONE 20 MG/1
20 TABLET ORAL
Qty: 9 | Refills: 0 | Status: ACTIVE | COMMUNITY
Start: 2021-04-06

## 2021-04-06 RX ORDER — DIAZEPAM 10 MG/1
10 TABLET ORAL
Qty: 2 | Refills: 0 | Status: ACTIVE | COMMUNITY
Start: 2021-04-06 | End: 1900-01-01

## 2021-04-08 ENCOUNTER — APPOINTMENT (OUTPATIENT)
Dept: OTOLARYNGOLOGY | Facility: CLINIC | Age: 51
End: 2021-04-08
Payer: COMMERCIAL

## 2021-04-08 ENCOUNTER — EMERGENCY (EMERGENCY)
Facility: HOSPITAL | Age: 51
LOS: 0 days | Discharge: HOME | End: 2021-04-08
Attending: STUDENT IN AN ORGANIZED HEALTH CARE EDUCATION/TRAINING PROGRAM | Admitting: STUDENT IN AN ORGANIZED HEALTH CARE EDUCATION/TRAINING PROGRAM
Payer: COMMERCIAL

## 2021-04-08 VITALS
HEART RATE: 79 BPM | DIASTOLIC BLOOD PRESSURE: 80 MMHG | OXYGEN SATURATION: 98 % | RESPIRATION RATE: 16 BRPM | TEMPERATURE: 97 F | SYSTOLIC BLOOD PRESSURE: 123 MMHG | HEIGHT: 67 IN

## 2021-04-08 VITALS
SYSTOLIC BLOOD PRESSURE: 134 MMHG | TEMPERATURE: 97 F | DIASTOLIC BLOOD PRESSURE: 72 MMHG | HEART RATE: 72 BPM | OXYGEN SATURATION: 99 % | RESPIRATION RATE: 17 BRPM
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
## 2021-04-08 DIAGNOSIS — J34.89 OTHER SPECIFIED DISORDERS OF NOSE AND NASAL SINUSES: ICD-10-CM

## 2021-04-08 DIAGNOSIS — R55 SYNCOPE AND COLLAPSE: ICD-10-CM

## 2021-04-08 DIAGNOSIS — Z90.49 ACQUIRED ABSENCE OF OTHER SPECIFIED PARTS OF DIGESTIVE TRACT: Chronic | ICD-10-CM

## 2021-04-08 DIAGNOSIS — I10 ESSENTIAL (PRIMARY) HYPERTENSION: ICD-10-CM

## 2021-04-08 DIAGNOSIS — Z98.890 OTHER SPECIFIED POSTPROCEDURAL STATES: ICD-10-CM

## 2021-04-08 DIAGNOSIS — Z79.899 OTHER LONG TERM (CURRENT) DRUG THERAPY: ICD-10-CM

## 2021-04-08 DIAGNOSIS — J34.3 HYPERTROPHY OF NASAL TURBINATES: ICD-10-CM

## 2021-04-08 DIAGNOSIS — Z98.890 OTHER SPECIFIED POSTPROCEDURAL STATES: Chronic | ICD-10-CM
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
## 2021-04-08 DIAGNOSIS — J32.9 CHRONIC SINUSITIS, UNSPECIFIED: ICD-10-CM

## 2021-04-08 DIAGNOSIS — Z88.1 ALLERGY STATUS TO OTHER ANTIBIOTIC AGENTS STATUS: ICD-10-CM

## 2021-04-08 DIAGNOSIS — S00.83XA CONTUSION OF OTHER PART OF HEAD, INITIAL ENCOUNTER: ICD-10-CM

## 2021-04-08 DIAGNOSIS — E03.9 HYPOTHYROIDISM, UNSPECIFIED: ICD-10-CM

## 2021-04-08 DIAGNOSIS — J32.0 CHRONIC MAXILLARY SINUSITIS: ICD-10-CM

## 2021-04-08 LAB
ALBUMIN SERPL ELPH-MCNC: 4.2 G/DL — SIGNIFICANT CHANGE UP (ref 3.5–5.2)
ALP SERPL-CCNC: 84 U/L — SIGNIFICANT CHANGE UP (ref 30–115)
ALT FLD-CCNC: 11 U/L — SIGNIFICANT CHANGE UP (ref 0–41)
ANION GAP SERPL CALC-SCNC: 10 MMOL/L — SIGNIFICANT CHANGE UP (ref 7–14)
AST SERPL-CCNC: 14 U/L — SIGNIFICANT CHANGE UP (ref 0–41)
BASOPHILS # BLD AUTO: 0.02 K/UL — SIGNIFICANT CHANGE UP (ref 0–0.2)
BASOPHILS NFR BLD AUTO: 0.2 % — SIGNIFICANT CHANGE UP (ref 0–1)
BILIRUB SERPL-MCNC: 0.4 MG/DL — SIGNIFICANT CHANGE UP (ref 0.2–1.2)
BUN SERPL-MCNC: 13 MG/DL — SIGNIFICANT CHANGE UP (ref 10–20)
CALCIUM SERPL-MCNC: 9 MG/DL — SIGNIFICANT CHANGE UP (ref 8.5–10.1)
CHLORIDE SERPL-SCNC: 104 MMOL/L — SIGNIFICANT CHANGE UP (ref 98–110)
CO2 SERPL-SCNC: 25 MMOL/L — SIGNIFICANT CHANGE UP (ref 17–32)
CREAT SERPL-MCNC: 0.7 MG/DL — SIGNIFICANT CHANGE UP (ref 0.7–1.5)
EOSINOPHIL # BLD AUTO: 0 K/UL — SIGNIFICANT CHANGE UP (ref 0–0.7)
EOSINOPHIL NFR BLD AUTO: 0 % — SIGNIFICANT CHANGE UP (ref 0–8)
GLUCOSE SERPL-MCNC: 120 MG/DL — HIGH (ref 70–99)
HCG SERPL QL: NEGATIVE — SIGNIFICANT CHANGE UP
HCT VFR BLD CALC: 38 % — SIGNIFICANT CHANGE UP (ref 37–47)
HGB BLD-MCNC: 12.3 G/DL — SIGNIFICANT CHANGE UP (ref 12–16)
IMM GRANULOCYTES NFR BLD AUTO: 0.5 % — HIGH (ref 0.1–0.3)
LYMPHOCYTES # BLD AUTO: 0.8 K/UL — LOW (ref 1.2–3.4)
LYMPHOCYTES # BLD AUTO: 6.4 % — LOW (ref 20.5–51.1)
MAGNESIUM SERPL-MCNC: 2.1 MG/DL — SIGNIFICANT CHANGE UP (ref 1.8–2.4)
MCHC RBC-ENTMCNC: 29.9 PG — SIGNIFICANT CHANGE UP (ref 27–31)
MCHC RBC-ENTMCNC: 32.4 G/DL — SIGNIFICANT CHANGE UP (ref 32–37)
MCV RBC AUTO: 92.5 FL — SIGNIFICANT CHANGE UP (ref 81–99)
MONOCYTES # BLD AUTO: 0.78 K/UL — HIGH (ref 0.1–0.6)
MONOCYTES NFR BLD AUTO: 6.3 % — SIGNIFICANT CHANGE UP (ref 1.7–9.3)
NEUTROPHILS # BLD AUTO: 10.78 K/UL — HIGH (ref 1.4–6.5)
NEUTROPHILS NFR BLD AUTO: 86.6 % — HIGH (ref 42.2–75.2)
NRBC # BLD: 0 /100 WBCS — SIGNIFICANT CHANGE UP (ref 0–0)
NT-PROBNP SERPL-SCNC: 214 PG/ML — SIGNIFICANT CHANGE UP (ref 0–300)
PLATELET # BLD AUTO: 380 K/UL — SIGNIFICANT CHANGE UP (ref 130–400)
POTASSIUM SERPL-MCNC: 4.5 MMOL/L — SIGNIFICANT CHANGE UP (ref 3.5–5)
POTASSIUM SERPL-SCNC: 4.5 MMOL/L — SIGNIFICANT CHANGE UP (ref 3.5–5)
PROT SERPL-MCNC: 6.9 G/DL — SIGNIFICANT CHANGE UP (ref 6–8)
RBC # BLD: 4.11 M/UL — LOW (ref 4.2–5.4)
RBC # FLD: 13.4 % — SIGNIFICANT CHANGE UP (ref 11.5–14.5)
SODIUM SERPL-SCNC: 139 MMOL/L — SIGNIFICANT CHANGE UP (ref 135–146)
TROPONIN T SERPL-MCNC: <0.01 NG/ML — SIGNIFICANT CHANGE UP
WBC # BLD: 12.44 K/UL — HIGH (ref 4.8–10.8)
WBC # FLD AUTO: 12.44 K/UL — HIGH (ref 4.8–10.8)

## 2021-04-08 PROCEDURE — 31295Z: CUSTOM

## 2021-04-08 PROCEDURE — 70450 CT HEAD/BRAIN W/O DYE: CPT | Mod: 26

## 2021-04-08 PROCEDURE — 30930 THER FX NASAL INF TURBINATE: CPT | Mod: 59

## 2021-04-08 PROCEDURE — 71045 X-RAY EXAM CHEST 1 VIEW: CPT | Mod: 26

## 2021-04-08 PROCEDURE — 93010 ELECTROCARDIOGRAM REPORT: CPT

## 2021-04-08 PROCEDURE — 99285 EMERGENCY DEPT VISIT HI MDM: CPT

## 2021-04-08 PROCEDURE — 99072 ADDL SUPL MATRL&STAF TM PHE: CPT

## 2021-04-08 PROCEDURE — 30802 ABLATE INF TURBINATE SUBMUC: CPT

## 2021-04-08 NOTE — CONSULT NOTE ADULT - SUBJECTIVE AND OBJECTIVE BOX
HPI: Pt is a 51y/o F sent in from Dr. Ventura's office s/p office procedure - balloon sinuplasty, pt syncopized post-procedure. Pt at this time denies SOB or diff breathing.    PAST MEDICAL & SURGICAL HISTORY:  Hypertension  Hypothyroid  History of hand surgery  S/P cholecystectomy    Allergies  erythromycin (Vomiting; Diarrhea)    FAMILY HISTORY:  Family history of coronary artery bypass graft (Sibling)    ROS:   ENT: all negative except as noted in HPI   CV: denies palpitations  Pulm: denies SOB, cough, hemoptysis  GI: denies change in apetite, indigestion, n/v  : denies pertinent urinary symptoms, urgency  Neuro: denies numbness/tingling, loss of sensation  Psych: denies anxiety  MS: denies muscle weakness, instability  Heme: denies easy bruising or bleeding  Endo: denies heat/cold intolerance, excessive sweating  Vascular: denies LE edema    Vital Signs Last 24 Hrs  T(C): 36.2 (08 Apr 2021 14:42), Max: 36.2 (08 Apr 2021 14:42)  T(F): 97.2 (08 Apr 2021 14:42), Max: 97.2 (08 Apr 2021 14:42)  HR: 72 (08 Apr 2021 14:42) (72 - 79)  BP: 134/72 (08 Apr 2021 14:42) (123/80 - 134/72)  RR: 17 (08 Apr 2021 14:42) (16 - 17)  SpO2: 99% (08 Apr 2021 14:42) (98% - 99%)                        12.3   12.44 )-----------( 380      ( 08 Apr 2021 10:11 )             38.0    04-08    139  |  104  |  13  ----------------------------<  120<H>  4.5   |  25  |  0.7    Ca    9.0      08 Apr 2021 10:11  Mg     2.1     04-08  TPro  6.9  /  Alb  4.2  /  TBili  0.4  /  DBili  x   /  AST  14  /  ALT  11  /  AlkPhos  84  04-08    PHYSICAL EXAM:  Gen: awake, alert, NAD. No drooling or pooling of secretions. No trismus.   Skin: No rashes, bruises, or lesions  HEENT: Head NC/AT. B/l EACs clear, TMs intact. Moustache dressing covering nares, no active bleeding noted. Oral cavity no erythema/edema. Tongue wnl. Uvula midline. Posterior oropharynx clear, no discharge/blood noted. Neck supple, trachea midline.   Resp: breathing easily, no stridor, no accessory muscle use   CV: no peripheral edema/cyanosis  GI: soft, nontender, nondistended  Neuro: A&Ox3  Psych: normal mood, normal affect    IMAGING/ADDITIONAL STUDIES:   EXAM:  CT BRAIN          PROCEDURE DATE:  04/08/2021    INTERPRETATION:  Clinical History / Reason for exam: Trauma.  CT SCAN OF THE BRAIN WITHOUT CONTRAST  TECHNIQUE:  Multiple transaxial noncontrast CT images of the brain were obtained from base to vertex. Sagittal and coronal reformatted images were obtained.  Comparison:  Noncontrast CT scan of the brain dated October 2, 2020.    FINDINGS:  The third, fourth, and lateral ventricles are normal in size and position.  There is no shift of the midline structures or evidence of acute intracranial hemorrhage or depressed skull fracture.  Mild right frontal extracalvarial soft tissue swelling.  Mucosal thickening in the ethmoid and left frontal sinuses.    IMPRESSION:  1.  No acute intracranial hemorrhage.  2.  Mild right frontal extracalvarial soft tissue swelling.    RAFAL AREVALO MD; Attending Radiologist  This document has been electronically signed. Apr 8 2021 10:21AM

## 2021-04-08 NOTE — ED ADULT NURSE NOTE - NSIMPLEMENTINTERV_GEN_ALL_ED
Implemented All Fall Risk Interventions:  [place] to call system. Call bell, personal items and telephone within reach. Instruct patient to call for assistance. Room bathroom lighting operational. Non-slip footwear when patient is off stretcher. Physically safe environment: no spills, clutter or unnecessary equipment. Stretcher in lowest position, wheels locked, appropriate side rails in place. Provide visual cue, wrist band, yellow gown, etc. Monitor gait and stability. Monitor for mental status changes and reorient to person, place, and time. Review medications for side effects contributing to fall risk. Reinforce activity limits and safety measures with patient and family.

## 2021-04-08 NOTE — ED PROVIDER NOTE - CLINICAL SUMMARY MEDICAL DECISION MAKING FREE TEXT BOX
50 year old female with a pmh of hypothyroid htn & frequent sinusitis presents here for a syncopal event. Patient was at  ENT having a sinoplasty when she began feeling hot and sweaty, she had her water bottle between her legs and was trying to take the plastic gown off and fell on the floor. No blood thinners. VS reviewed. Labs imaging ekg obtained and reviewed. CT head negative. Patient felt better. Patient a spoken to in detail about results  All questions addressed.  Results of ED work up discussed and patient given a copy of the results. Patient has proper follow up (her cardiologist is Dr. Yang). Return precautions given.

## 2021-04-08 NOTE — ED PROVIDER NOTE - ATTENDING CONTRIBUTION TO CARE
50 year old female with a pmh of hypothyroid htn & frequent sinusitis presents here for a syncopal event. Patient was at  ENT having a sinoplasty when she began feeling hot and sweaty, she had her water bottle between her legs and was trying to take the plastic gown off and fell on the floor. No blood thinners. Patient endorses having 1 syncopal episode in the past where it was due to dehydration and heat. She follows with Dr. Yang as her cardiologist. No recent fever chills cough sob n/v abdominal pain.  On exam  CONSTITUTIONAL: WA / WN / NAD  HEAD: + right forehead hematoma  EYES: PERRL; EOMI;   ENT: Normal pharynx; mucous membranes pink/moist, no erythema. + banaged by ENT   NECK: Supple; no meningeal signs  CARD: RRR; nl S1/S2; no M/R/G.   RESP: Respiratory rate and effort are normal; breath sounds clear and equal bilaterally.  ABD: Soft, NT ND   MSK/EXT: No gross deformities; full range of motion.  SKIN: Warm and dry;   NEURO: AAOx3,  PSYCH: Memory Intact, Normal Affect

## 2021-04-08 NOTE — ED PROVIDER NOTE - CARE PROVIDER_API CALL
Jose Yang)  Cardiovascular Disease; Internal Medicine  [address] [place] [address]  Phone: [phone]  Fax: [phone]  Follow Up Time:

## 2021-04-08 NOTE — ED PROVIDER NOTE - NS ED ROS FT
Constitutional: See HPI.  Eyes: No visual changes, eye pain or discharge. No Photophobia  ENMT: see hpi  Cardiac: see hpi  Respiratory: see hpi  GI: No nausea, vomiting, diarrhea or abdominal pain.  : No dysuria, frequency or burning. No Discharge  MS: No myalgia, muscle weakness, joint pain or back pain.  Psych: No suicidal or homicidal ideations.  Neuro: see hpi  Skin: No skin rash.

## 2021-04-08 NOTE — CONSULT NOTE ADULT - ASSESSMENT
49y/o F with syncope    - Plan as per attending.  - Dr. Ventura rec CT Head r/o intracranial hemorrhage - negative   - Observation period in the ED.  - No acute ENT intervention at this time   - D/w ED Team.

## 2021-04-08 NOTE — ED PROVIDER NOTE - OBJECTIVE STATEMENT
50 year old female with a pmh of hypothyroid htn & frequent sinusitis presents here for a syncopal event. Patient was at  ENT having a sinoplasty when she began feeling hot and sweaty, she had her water bottle between her legs and was trying to take the plastic gown off and fell on the floor. No blood thinners. Patient endorses having 1 syncopal episode in the past where it was due to dehydration and heat. She follows with Dr. Yang as her cardiologist. No recent fever chills cough sob n/v abdominal pain.  On exam

## 2021-04-08 NOTE — ED ADULT NURSE NOTE - OBJECTIVE STATEMENT
Patient present to ED with complains of a syncope episode after having an ENT procedure done at doctors office. As per patient, she woke up on the floor from sitting position, denies use of anticoagulant or other injuries noted. Patient had nasal procedure done, bleeding controlled.

## 2021-04-08 NOTE — ED PROVIDER NOTE - PHYSICAL EXAMINATION
CONSTITUTIONAL: WA / WN / NAD  HEAD: + right forehead hematoma  EYES: PERRL; EOMI;   ENT: Normal pharynx; mucous membranes pink/moist, no erythema. + banaged by ENT   NECK: Supple; no meningeal signs  CARD: RRR; nl S1/S2; no M/R/G.   RESP: Respiratory rate and effort are normal; breath sounds clear and equal bilaterally.  ABD: Soft, NT ND   MSK/EXT: No gross deformities; full range of motion.  SKIN: Warm and dry;   NEURO: AAOx3,  PSYCH: Memory Intact, Normal Affect.

## 2021-04-08 NOTE — ED PROVIDER NOTE - PATIENT PORTAL LINK FT
You can access the FollowMyHealth Patient Portal offered by [facility] by registering at the following website: [email or web address]. By joining [email or web address]’s [email or web address] portal, you will also be able to view your health information using other applications (apps) compatible with our system.

## 2021-04-12 ENCOUNTER — OUTPATIENT (OUTPATIENT)
Dept: OUTPATIENT SERVICES | Facility: HOSPITAL | Age: 51
LOS: 1 days | Discharge: HOME | End: 2021-04-12
Payer: COMMERCIAL

## 2021-04-12 DIAGNOSIS — J01.00 ACUTE MAXILLARY SINUSITIS, UNSPECIFIED: ICD-10-CM

## 2021-04-12 DIAGNOSIS — Z90.49 ACQUIRED ABSENCE OF OTHER SPECIFIED PARTS OF DIGESTIVE TRACT: Chronic | ICD-10-CM

## 2021-04-12 DIAGNOSIS — Z98.890 OTHER SPECIFIED POSTPROCEDURAL STATES: Chronic | ICD-10-CM

## 2021-04-12 DIAGNOSIS — H11.31 CONJUNCTIVAL HEMORRHAGE, RIGHT EYE: ICD-10-CM

## 2021-04-12 PROCEDURE — 70220 X-RAY EXAM OF SINUSES: CPT | Mod: 26

## 2021-04-12 PROCEDURE — 70200 X-RAY EXAM OF EYE SOCKETS: CPT | Mod: 26

## 2021-04-12 PROCEDURE — 70160 X-RAY EXAM OF NASAL BONES: CPT | Mod: 26

## 2021-04-15 ENCOUNTER — APPOINTMENT (OUTPATIENT)
Dept: OTOLARYNGOLOGY | Facility: CLINIC | Age: 51
End: 2021-04-15
Payer: COMMERCIAL

## 2021-04-15 PROCEDURE — 99024 POSTOP FOLLOW-UP VISIT: CPT

## 2021-04-15 PROCEDURE — 31237 NSL/SINS NDSC SURG BX POLYPC: CPT | Mod: 50,58

## 2021-04-15 NOTE — HISTORY OF PRESENT ILLNESS
[FreeTextEntry1] : Patient presents today following up on chronic maxillary sinusitis. Patient is s/p turb reduction and balloon. Patient still c/o right sided facial pressure. The roof of her mouth is numb. Patient admits otalgia has resolved.

## 2021-04-15 NOTE — PROCEDURE
[Recalcitrant Symptoms] : recalcitrant symptoms  [Rigid Endoscope] : examined with a rigid endoscope [Congested] : congested [Nasal Mucosa] : bilateral purulence [Bilateral] : bilateral debridement of the nasal cavity [Normal] : the paranasal sinuses had no abnormalities [Severe] : severe [Kaiden] : on both sides [Removed] : which was removed

## 2021-04-15 NOTE — PHYSICAL EXAM
[FreeTextEntry1] : right inferior orbital ecchymosis  [Nasal Endoscopy Performed] : nasal endoscopy was performed, see procedure section for findings [Normal] : external appearance is normal [de-identified] : edema  [de-identified] : thickened

## 2021-04-19 ENCOUNTER — APPOINTMENT (OUTPATIENT)
Dept: NEUROLOGY | Facility: CLINIC | Age: 51
End: 2021-04-19

## 2021-04-21 ENCOUNTER — APPOINTMENT (OUTPATIENT)
Dept: OTOLARYNGOLOGY | Facility: CLINIC | Age: 51
End: 2021-04-21
Payer: COMMERCIAL

## 2021-04-21 DIAGNOSIS — Z98.890 OTHER SPECIFIED POSTPROCEDURAL STATES: ICD-10-CM

## 2021-04-21 PROCEDURE — 31231 NASAL ENDOSCOPY DX: CPT | Mod: 58

## 2021-04-21 PROCEDURE — 99024 POSTOP FOLLOW-UP VISIT: CPT

## 2021-04-21 NOTE — PROCEDURE
[Post-Op Patency] : post-op patency [Rigid Endoscope] : examined with a rigid endoscope [Normal] : the paranasal sinuses had no abnormalities [FreeTextEntry1] : mild crusting

## 2021-04-21 NOTE — HISTORY OF PRESENT ILLNESS
[FreeTextEntry1] : Patient presents today following up on chronic sinusitis. Patient is s/p turb reduction and balloon.

## 2021-04-30 ENCOUNTER — APPOINTMENT (OUTPATIENT)
Dept: CARDIOLOGY | Facility: CLINIC | Age: 51
End: 2021-04-30

## 2021-05-05 ENCOUNTER — RX RENEWAL (OUTPATIENT)
Age: 51
End: 2021-05-05

## 2021-05-05 RX ORDER — FAMOTIDINE 40 MG/1
40 TABLET, FILM COATED ORAL
Qty: 30 | Refills: 3 | Status: ACTIVE | COMMUNITY
Start: 2020-12-31 | End: 1900-01-01

## 2021-05-05 RX ORDER — PANTOPRAZOLE 40 MG/1
40 TABLET, DELAYED RELEASE ORAL
Qty: 30 | Refills: 3 | Status: ACTIVE | COMMUNITY
Start: 2020-12-31 | End: 1900-01-01

## 2021-05-12 ENCOUNTER — APPOINTMENT (OUTPATIENT)
Dept: OTOLARYNGOLOGY | Facility: CLINIC | Age: 51
End: 2021-05-12

## 2021-05-31 NOTE — ASU PATIENT PROFILE, ADULT - TOBACCO USE
[facility]    Bedside and Verbal shift change report given to Willi Clancy RN (oncoming nurse) by Brigido Choe RN   (offgoing nurse). Report included the following information SBAR, Kardex, MAR and Recent Results.     Situation:   Code Status: Full Code   Reason for Admission: CVA  Hospital Day: 4   Problem List:   Hospital Problems  Date Reviewed: 5/28/2021        Codes Class Noted POA    High serum magnesium ICD-10-CM: R79.89  ICD-9-CM: 790.99  5/28/2021 Yes        Hypertensive heart and kidney disease without heart failure and with stage 3b chronic kidney disease (HCC) (Chronic) ICD-10-CM: I13.10, N18.32  ICD-9-CM: 404.90, 585.3  Unknown Yes        Mixed hyperlipidemia (Chronic) ICD-10-CM: E78.2  ICD-9-CM: 272.2  Unknown Yes        Constipation (Chronic) ICD-10-CM: K59.00  ICD-9-CM: 564.00  Unknown Yes        Current use of aspirin ICD-10-CM: Z79.82  ICD-9-CM: V58.66  5/23/2021 Yes        On clopidogrel therapy ICD-10-CM: Z79.01  ICD-9-CM: V58.61  5/23/2021 Yes        On statin therapy due to risk of future cardiovascular event ICD-10-CM: Z79.899  ICD-9-CM: V58.69  5/22/2021 Yes    Overview Signed 5/27/2021  2:27 PM by Sintia Richardson MD     On Atorvastatin             * (Principal) Acute lacunar stroke [facility]) ICD-10-CM: I63.81  ICD-9-CM: 434.91  5/21/2021 Yes    Overview Signed 5/27/2021  2:15 PM by Sintia Richardson MD     Acute Lacunar Stroke (acute lacunar infarct in the posterior left lentiform nucleus extending into the corona radiata) with residual right hemiparesis             Impaired mobility and ADLs ICD-10-CM: Z74.09, Z78.9  ICD-9-CM: V49.89  5/21/2021 Yes        Hemiparesis of right dominant side due to acute cerebrovascular disease ([address] ICD-10-CM: I67.89, G81.91  ICD-9-CM: 436, 342.91  5/21/2021 Yes              Background:   Past Medical History:   Past Medical History:   Diagnosis Date    Abnormal mammogram 2014    left with biopsy    [facility] 5/21/2021    Acute Lacunar Stroke (acute lacunar infarct in the posterior left lentiform nucleus extending into the corona radiata) with residual right hemiparesis    Bacterial vaginosis 1/9/15    Dr Ofelia Barksdale Constipation     Current use of aspirin 5/23/2021    Gastroesophageal reflux disease 6/30/2016    Hemiparesis of right dominant side due to acute cerebrovascular disease ([facility] [address] 5/21/2021    History of Helicobacter pylori infection 7/24/2015    History of iron deficiency anemia 06/2014    History of vitamin D deficiency 6/11/2015    Hot flashes 1/9/15    Dr Ofelia Barksdale On clopidogrel therapy 5/23/2021    On statin therapy due to risk of future cardiovascular event 5/22/2021    On Atorvastatin    Rheumatoid arthritis ([facility]     Stage 3b chronic kidney disease ([facility] 7/24/2015    Tobacco use disorder       Patient taking anticoagulants yes    Patient has a defibrillator: no     Assessment:   Changes in Assessment throughout shift: none    Patient has central line: no  I     Last Vitals:     Vitals:    05/29/21 2155 05/30/21 0735 05/30/21 2100 05/31/21 0719   BP: (!) 153/90 (!) 147/98 (!) 148/93 (!) 143/87   Pulse: 82 78 86 78   Resp: 18 17 18 18   Temp: 97.3 °F (36.3 °C) (!) 96.5 °F (35.8 °C) 97.2 °F (36.2 °C) 97 °F (36.1 °C)   SpO2: 99% 96% 100% 99%   Weight:       Height:       LMP: 10/15/2014        PAIN    Pain Assessment    Pain Intensity 1: 0 (05/31/21 0719) Pain Intensity 1: 2 (12/29/14 1105)    Pain Location 1: Arm Pain Location 1: Abdomen    Pain Intervention(s) 1: Medication (see MAR) Pain Intervention(s) 1: Medication (see MAR)  Patient Stated Pain Goal: 0 Patient Stated Pain Goal: 0  o Intervention effective: no pain reported  o Other actions taken for pain: no pain reported     Skin Assessment  Skin color    Condition/Temperature    Integrity    Turgor    Weekly Pressure Ulcer Documentation  Pressure  Injury Documentation: No Pressure Injury Noted-Pressure Ulcer Prevention Initiated  Wound Prevention & Protection Methods  Orientation of wound Orientation of Wound Prevention: Posterior  Location of Prevention Location of Wound Prevention: Buttocks, Sacrum/Coccyx  Dressing Present Dressing Present : No  Dressing Status    Wound Offloading Wound Offloading (Prevention Methods): Bed, pressure redistribution/air     INTAKE/OUPUT  Date 05/30/21 0700 - 05/31/21 0659 05/31/21 0700 - 06/01/21 0659   Shift 0700-1859 1900-0659 24 Hour Total 0700-1859 1900-0659 24 Hour Total   INTAKE   Shift Total(mL/kg)         OUTPUT   Urine(mL/kg/hr)           Urine Occurrence(s) 3 x 3 x 6 x      Stool           Stool Occurrence(s) 0 x 0 x 0 x      Shift Total(mL/kg)         NET         Weight (kg) 84.4 84.4 84.4 84.4 84.4 84.4       Recommendations:  1. Patient needs and requests: Assist with toileting    2. Diet: Cardiac & thin liquids    3. Pending tests/procedures: none  4. .     5. Functional Level/Equipment: W/C with min assist    6. Estimated Discharge Date: TBD Posted on Whiteboard in Patients Room: no       [facility] Safety Check    A safety check occurred in the patient's room between off going nurse and oncoming nurse listed above. The safety check included the below items  Area Items   H  High Alert Medications - Verify all high alert medication drips (heparin, PCA, etc.)   E  Equipment - Suction is set up for ALL patients (with yanker)  - Red plugs utilized for all equipment (IV pumps, etc.)  - WOWs wiped down at end of shift.  - Room stocked with oxygen, suction, and other unit-specific supplies   A  Alarms - Bed alarm is set for fall risk patients  - Ensure chair alarm is in place and activated if patient is up in a chair   L  Lines - Check IV for any infiltration  - Rollins bag is empty if patient has a Rollins   - Tubing and IV bags are labeled   S  Safety   - Room is clean, patient is clean, and equipment is clean. - Hallways are clear from equipment besides carts.    - Fall bracelet on for fall risk patients  - Ensure room is clear and free of clutter  - Suction is set up for ALL patients (with blessing)  - Hallways are clear from equipment besides carts.    - Isolation precautions followed, supplies available outside room, sign posted Current every day smoker

## 2021-08-20 ENCOUNTER — TRANSCRIPTION ENCOUNTER (OUTPATIENT)
Age: 51
End: 2021-08-20

## 2021-09-15 ENCOUNTER — APPOINTMENT (OUTPATIENT)
Dept: OBGYN | Facility: CLINIC | Age: 51
End: 2021-09-15
Payer: COMMERCIAL

## 2021-09-15 ENCOUNTER — NON-APPOINTMENT (OUTPATIENT)
Age: 51
End: 2021-09-15

## 2021-09-15 VITALS
WEIGHT: 179 LBS | SYSTOLIC BLOOD PRESSURE: 146 MMHG | BODY MASS INDEX: 28.09 KG/M2 | HEIGHT: 67 IN | HEART RATE: 89 BPM | DIASTOLIC BLOOD PRESSURE: 81 MMHG | TEMPERATURE: 95.2 F

## 2021-09-15 DIAGNOSIS — Z20.2 CONTACT WITH AND (SUSPECTED) EXPOSURE TO INFECTIONS WITH A PREDOMINANTLY SEXUAL MODE OF TRANSMISSION: ICD-10-CM

## 2021-09-15 DIAGNOSIS — C50.919 MALIGNANT NEOPLASM OF UNSPECIFIED SITE OF UNSPECIFIED FEMALE BREAST: ICD-10-CM

## 2021-09-15 DIAGNOSIS — Z01.419 ENCOUNTER FOR GYNECOLOGICAL EXAMINATION (GENERAL) (ROUTINE) W/OUT ABNORMAL FINDINGS: ICD-10-CM

## 2021-09-15 LAB
BILIRUB UR QL STRIP: NORMAL
GLUCOSE UR-MCNC: NORMAL
HCG UR QL: 0.2 EU/DL
HGB UR QL STRIP.AUTO: NORMAL
KETONES UR-MCNC: NORMAL
LEUKOCYTE ESTERASE UR QL STRIP: NORMAL
NITRITE UR QL STRIP: NORMAL
PH UR STRIP: 6
PROT UR STRIP-MCNC: NORMAL
SP GR UR STRIP: 1.01

## 2021-09-15 PROCEDURE — 76830 TRANSVAGINAL US NON-OB: CPT

## 2021-09-15 PROCEDURE — 99396 PREV VISIT EST AGE 40-64: CPT | Mod: 25

## 2021-09-15 NOTE — PROCEDURE
[Abnormal Uterine Bleeding] : abnormal uterine bleeding [Suspected Ovarian Cyst] : suspected ovarian cyst [FreeTextEntry3] : bilateral small ovarian cysts\par cervix normal\par  no free fluid [FreeTextEntry5] : 93cc vol    lining 7mm [FreeTextEntry7] : 7.4cc, cyst [FreeTextEntry8] : 6.3 cc cyst

## 2021-09-15 NOTE — HISTORY OF PRESENT ILLNESS
[FreeTextEntry1] :  has been disloyal, pt concerned about STD's\par \par last zaria visit\par \par    	Print\par Patient\par Name	EFRAIN DUNHAM (49yo, F) ID# [id number]	Appt. Date/Time	2021 12:40PM\par 	1970	Service Dept.	[facility] OFFICE\par Provider	MARTA ELI MD\par Insurance	\par Med Primary: [employer]par Insurance # : [id number]\par Policy/Group # : [id number]\par Med Secondary: [facility]\par Insurance # : [id number]\par Prescription: [employer] - Member is eligible. details\par Prescription: [employer] - Member is eligible. details\par Chief Complaint\par Well Woman Visit\par \par pt c/o irregular periods and painful periods\par Patient's Care Team\par Primary Care Provider: WILBERT MEYER MD: [address] [place] [address], Ph [phone], Fax [phone] NPI: [id number]\par Patient's Pharmacies\par [id number] ([place] [address] [place] [address], Ph [phone], Fax [phone]\par Vitals\par Ht:	5 ft 7 in 2021 01:27 pm\par Wt:	216 lbs 2021 01:28 pm\par BMI:	33.8 2021 01:28 pm\par BP:	130/70 2021 01:28 pm\par T:	98.8 F° 2021 01:28 pm\par Allergies\par Reviewed Allergies\par LATEX: Itching, Rash	\par Medications\par Reviewed Medications\par amoxicillin 500 mg capsule\par 18   filled	[employer]\par amoxicillin 875 mg tablet\par TAKE 1 TABLET BY MOUTH TWICE A DAY\par 20   filled	surescripts\par atorvastatin 10 mg tablet\par TAKE 1 TABLET BY MOUTH EVERY DAY\par 12/15/20   filled	surescripts\par azithromycin 250 mg tablet\par TAKE 4 TABLETS ONCE\par 20   filled	surescripts\par azithromycin 500 mg tablet\par Take 2 tablet(s) every day by oral route as directed for 1 day.\par 20   filled	[employer]\par benzonatate 100 mg capsule\par 18   filled	[employer]\par Breo Ellipta 100 mcg-25 mcg/dose powder for inhalation\par TAKE 1 PUFF BY MOUTH EVERY DAY\par 20   filled	surescripts\par busPIRone 7.5 mg tablet\par 18   filled	[employer]\par cefUROXime axetiL 500 mg tablet\par TAKE 1 TABLET BY MOUTH EVERY 12 HOURS\par 20   filled	surescripts\par Clenpiq 10 mg-3.5 gram-12 gram/160 mL oral solution\par 20   filled	[employer]\par cyclobenzaprine 10 mg tablet\par 19   filled	[employer]\par doxycycline hyclate 100 mg capsule\par TAKE 1 CAPSULE BY MOUTH EVERY 12 HOURS UNTIL FINISHED\par 10/07/20   filled	surescripts\par famotidine 40 mg tablet\par TAKE 1 TABLET BY MOUTH EVERYDAY AT BEDTIME\par 21   filled	surescripts\par fluticasone propionate 50 mcg/actuation nasal spray,suspension\par SPRAY 1 SPRAY INTO EACH NOSTRIL TWICE A DAY\par 20   filled	surescripts\par ibuprofen 800 mg tablet\par 19   filled	[employer]\par levothyroxine 150 mcg tablet\par 21   filled	surescripts\par levothyroxine 175 mcg tablet\par TAKE 1 TABLET BY MOUTH EVERY DAY\par 20   filled	surescripts\par levothyroxine 200 mcg tablet\par 19   filled	[employer]\par lisinopriL 20 mg tablet\par 19   filled	[employer]\par methylPREDNISolone 4 mg tablets in a dose pack\par TAKE 6 TABLETS ON DAY 1 AS DIRECTED ON PACKAGE AND DECREASE BY 1 TAB EACH DAY FOR A TOTAL OF 6 DAYS\par 10/07/20   filled	surescripts\par metoprolol tartrate 25 mg tablet\par TAKE ONE TABLE ON THE MORNING OF THE CT SCAN OF THE HEART .\par 12/15/20   filled	surescripts\par nabumetone 750 mg tablet\par 19   filled	[employer]\par naproxen 500 mg tablet\par 10/09/19   filled	[employer]\par omeprazole 40 mg capsule,delayed release\par 20   filled	[employer]\par oxyCODONE-acetaminophen 5 mg-325 mg tablet\par TAKE 1 TABLET BY MOUTH EVERY 6 HOURS AS NEEDED FOR PAIN\par 10/08/20   filled	surescripts\par pantoprazole 40 mg tablet,delayed release\par TAKE 1 TABLET BY MOUTH EVERY DAY\par 21   filled	surescripts\par ProAir RespiClick 90 mcg/actuation breath activated\par 21   filled	surescripts\par promethazine 25 mg tablet\par TAKE 1 TABLET BY MOUTH THREE TIMES A DAY AS NEEDED\par 10/06/20   filled	surescripts\par Pulmicort Flexhaler 90 mcg/actuation breath activated\par 21   filled	surescripts\par SUMAtriptan 100 mg tablet\par TAKE 1 TABLET AT ONSET OF MIGRAINE HEADACHE. MAY REPEAT IN 2 HOURS IF NEEDED.\par 20   filled	surescripts\par Ventolin HFA 90 mcg/actuation aerosol inhaler\par 21   filled	surescripts\par \par LOW DOSE ASPIRIN\par Problems\par Reviewed Problems\par Chlamydial infection - Onset: 2020\par Family History\par Reviewed Family History\par Mother	- Malignant tumor of vertebral column (onset age: 60)\par Father	- Myocardial infarction ( age: 69)\par Social History\par Reviewed Social History\par Routine Gyn\par Tobacco Smoking Status: Former smoker (Notes: quit smoking 2018, vaped for a month to ween herself off of cigarettes)\par Smoker (1 PPD)\par Smokeless Tobacco Status: Never used smokeless tobacco\par Tobacco-years of use: 30\par E-cigarette/Vape Status: Former user of electronic cigarettes\par Most Recent Tobacco Use Screenin2021\par Occupation: \par Marital status: \par Illicit drugs: marijuana\par Sexually active?: Y\par Protected sex?: No\par Sexual orientation : Straight or heterosexual\par Surgical History\par Reviewed Surgical History\par Cholecystectomy\par GYN History\par Reviewed GYN History\par Duration of Flow (days): 5.\par LMP: Definite.\par Menses Monthly: Yes (Notes: irregular).\par Flow: Moderate.\par Current Birth Control Method: Withdrawal.\par Date of LMP: 2021 (Notes: painful, irregular periods).\par Obstetric History\par Reviewed Obstetric History\par TOTAL	FULL	PRE	AB. I	AB. S	ECTOPICS	MULTIPLE	LIVING\par 2	2						2\par \par Past Medical History\par Discussed Past Medical History\par Anemia: Y\par Arthritis: Y\par GI Problems: Y - h hernia, colitis\par Headaches or Migraines: Y\par High Blood Pressure: Y\par Thyroid Problems: Y - hypo\par Screening\par None recorded.\par HPI\par 50 year old here for annual exam, complaints of irregular menses with pain starting 1.5 years ago, with right sided pelvic and back pain.\par \par Saw GI doctor and started new diet with probiotics, GI distress with reflex improved.\par \par Had breast biopsy done in January for abnormal mammogram, negative per patient.\par \par ROS\par Patient reports abnormal bleeding but reports no trouble urinating, no incontinence, no rash, no lesion, no discharge, no vaginal odor, and no vaginal itching. She reports no fatigue, no fever, no significant weight gain, and no significant weight loss. She reports no dyspnea / shortness of breath, no cough, and no wheezing. She reports no chest pain and no palpitations. She reports no nausea, no vomiting, no abdominal pain, no bowel movement changes, no diarrhea, no constipation, and no rectal bleeding. She reports no menstrual problems and no PMDD symptoms. She reports no headaches, no dizziness, and no weakness.\par Physical Exam\par Patient is a 50-year-old female.\par \par Chaperone: Chaperone: present.\par \par Constitutional: General Appearance: healthy-appearing, well-nourished, and well-developed.\par \par Psychiatric: Mood and Affect: normal mood and affect and active and alert.\par \par Skin: Appearance: no rashes or lesions.\par \par Lungs: Respiratory Effort: no intercostal retractions or accessory muscle usage.\par \par Cardiovascular: Peripheral Vascular: no LLE edema or RLE edema.\par \par Abdomen: Auscultation/Inspection/Palpation: soft, non-distended, and no tenderness. Hernia: none palpated.\par \par Breast: Inspection/Palpation: patient declines breast exam (done by breast specialist).\par \par Female Genitalia: Vulva: no masses, atrophy, or lesions. Vagina: no tenderness, erythema, cystocele, rectocele, abnormal vaginal discharge, or vesicle(s) or ulcers. Cervix: no discharge or cervical motion tenderness and grossly normal and sample taken for a Pap smear. Uterus: normal size and shape and midline, mobile, non-tender, and no uterine prolapse. Bladder/Urethra: no urethral discharge or mass and normal meatus and bladder non distended. Adnexa/Parametria: no parametrial tenderness or mass and no adnexal tenderness or ovarian mass.\par \par Lymph Nodes: Palpation: non tender inguinal nodes.\par Assessment / Plan\par 1. Gynecologic examination -\par PCP for age appropriate testing, sees breast specialist for breast CA screening done in January\par \par Z01.419: Encounter for gynecological examination (general) (routine) without abnormal findings\par PAP, IG + HPV, CERVICAL\par URINALYSIS, DIPSTICK\par CHLAMYDIA TRACHOMATIS + NEISSERIA GONORRHEA RRNA, QL, GENITAL\par \par 2. Irregular periods -\par cycles irregular in frequency, but when she does bleed it is consistent, 5 days, moderate amount. TVUS unremarkable, will obtain official US for further evaluation\par \par N92.6: Irregular menstruation, unspecified\par US, TRANSVAGINAL\par \par 3. Pain in pelvis\par R10.2: Pelvic and perineal pain\par US, PELVIS, COMPLETE\par \par US, TRANSVAGINAL\par \par Review of us, transvaginal taken on 2021 at [facility] OFFICE shows:\par Imaging Studies:\par Indications: pelvic pain and abnormal bleeding.\par Uterus: volume (cc): 88.\par Endometrium: thickness 5.7 mm.\par Cervix: normal.\par Cul de sac: no fluid was demonstrated.\par Right Ovary: volume (cc): 15.\par Left Ovary: volume (cc): 12.1.\par \par Return to Office\par None recorded.\par Encounter Sign-Off\par Encounter signed-off by Marta Eli MD, 2021.\par Encounter performed and documented by Marta Eli MD\par Encounter reviewed & signed by Marta Eli MD on 2021 at 12:24pm\par Audit history\par

## 2021-09-19 LAB
C TRACH RRNA SPEC QL NAA+PROBE: NOT DETECTED
HPV HIGH+LOW RISK DNA PNL CVX: NOT DETECTED
N GONORRHOEA RRNA SPEC QL NAA+PROBE: NOT DETECTED
SOURCE AMPLIFICATION: NORMAL

## 2021-10-03 LAB — CYTOLOGY CVX/VAG DOC THIN PREP: NORMAL

## 2021-10-14 ENCOUNTER — TRANSCRIPTION ENCOUNTER (OUTPATIENT)
Age: 51
End: 2021-10-14

## 2021-10-18 ENCOUNTER — NON-APPOINTMENT (OUTPATIENT)
Age: 51
End: 2021-10-18

## 2021-12-16 ENCOUNTER — APPOINTMENT (OUTPATIENT)
Dept: OBGYN | Facility: CLINIC | Age: 51
End: 2021-12-16
Payer: COMMERCIAL

## 2021-12-16 VITALS
HEART RATE: 81 BPM | DIASTOLIC BLOOD PRESSURE: 99 MMHG | WEIGHT: 168 LBS | HEIGHT: 67 IN | SYSTOLIC BLOOD PRESSURE: 133 MMHG | BODY MASS INDEX: 26.37 KG/M2

## 2021-12-16 DIAGNOSIS — N83.01 FOLLICULAR CYST OF RIGHT OVARY: ICD-10-CM

## 2021-12-16 DIAGNOSIS — N83.02 FOLLICULAR CYST OF RIGHT OVARY: ICD-10-CM

## 2021-12-16 DIAGNOSIS — N92.6 IRREGULAR MENSTRUATION, UNSPECIFIED: ICD-10-CM

## 2021-12-16 DIAGNOSIS — N94.3 PREMENSTRUAL TENSION SYNDROME: ICD-10-CM

## 2021-12-16 PROCEDURE — 76830 TRANSVAGINAL US NON-OB: CPT

## 2021-12-16 PROCEDURE — 99213 OFFICE O/P EST LOW 20 MIN: CPT | Mod: 25

## 2021-12-16 RX ORDER — FLUOXETINE HYDROCHLORIDE 20 MG/1
20 CAPSULE ORAL
Qty: 90 | Refills: 3 | Status: ACTIVE | COMMUNITY
Start: 2021-12-16 | End: 1900-01-01

## 2021-12-16 NOTE — PROCEDURE
[Transvaginal Ultrasound] : transvaginal ultrasound [FreeTextEntry3] : cysts are gone\par cervix normal ovaries normal\par no free fluid [FreeTextEntry5] : 56.5cc,  lining 3.7mm [FreeTextEntry7] : 1.0cc [FreeTextEntry8] : 0.8cc

## 2021-12-16 NOTE — HISTORY OF PRESENT ILLNESS
[FreeTextEntry1] : under a lot of stress , mood swings, at times depressed....not sucidal\par had pain and mucousy dc post menses\par here for fu of her bilateral follicular cysts\par

## 2022-02-28 ENCOUNTER — TRANSCRIPTION ENCOUNTER (OUTPATIENT)
Age: 52
End: 2022-02-28

## 2022-03-20 ENCOUNTER — EMERGENCY (EMERGENCY)
Facility: HOSPITAL | Age: 52
LOS: 0 days | Discharge: HOME | End: 2022-03-20
Attending: EMERGENCY MEDICINE | Admitting: EMERGENCY MEDICINE
Payer: COMMERCIAL

## 2022-03-20 ENCOUNTER — TRANSCRIPTION ENCOUNTER (OUTPATIENT)
Age: 52
End: 2022-03-20

## 2022-03-20 VITALS
HEIGHT: 67 IN | WEIGHT: 164.91 LBS | HEART RATE: 85 BPM | OXYGEN SATURATION: 99 % | TEMPERATURE: 99 F | SYSTOLIC BLOOD PRESSURE: 154 MMHG | DIASTOLIC BLOOD PRESSURE: 89 MMHG | RESPIRATION RATE: 18 BRPM

## 2022-03-20 DIAGNOSIS — N64.4 MASTODYNIA: ICD-10-CM

## 2022-03-20 DIAGNOSIS — Z90.49 ACQUIRED ABSENCE OF OTHER SPECIFIED PARTS OF DIGESTIVE TRACT: Chronic | ICD-10-CM

## 2022-03-20 DIAGNOSIS — Z98.890 OTHER SPECIFIED POSTPROCEDURAL STATES: Chronic | ICD-10-CM

## 2022-03-20 DIAGNOSIS — Z88.0 ALLERGY STATUS TO PENICILLIN: ICD-10-CM

## 2022-03-20 DIAGNOSIS — I10 ESSENTIAL (PRIMARY) HYPERTENSION: ICD-10-CM

## 2022-03-20 DIAGNOSIS — N64.59 OTHER SIGNS AND SYMPTOMS IN BREAST: ICD-10-CM

## 2022-03-20 DIAGNOSIS — M25.532 PAIN IN LEFT WRIST: ICD-10-CM

## 2022-03-20 DIAGNOSIS — E03.9 HYPOTHYROIDISM, UNSPECIFIED: ICD-10-CM

## 2022-03-20 PROCEDURE — 99282 EMERGENCY DEPT VISIT SF MDM: CPT

## 2022-03-20 NOTE — ED PROVIDER NOTE - NSFOLLOWUPCLINICS_GEN_ALL_ED_FT
[facility]  Breast  [address], [address]  [place] [address]  Phone: [phone]  Fax:

## 2022-03-20 NOTE — ED PROVIDER NOTE - PATIENT PORTAL LINK FT
You can access the FollowMyHealth Patient Portal offered by [facility] by registering at the following website: [email or web address]. By joining [employer]’s FollowMyHealth portal, you will also be able to view your health information using other applications (apps) compatible with our system.

## 2022-03-20 NOTE — ED PROVIDER NOTE - NSFOLLOWUPINSTRUCTIONS_ED_ALL_ED_FT
Please take Ibuprofen 600mg every 6 hours as needed for pain in left breast. You can pick this up over the counter at the pharmacy, sold as Advil or Ibuprofen.     Please follow up at the breast clinic as soon as possible for evaluation of left breast.     Patient education: Common breast problems (The Basics)  View in   language     Written by the doctors and editors at [facility]  What kinds of problems can women have with their breasts?  Women can have different kinds of problems with their breasts. The important thing to know is that for most but not all women, most breast-related problems are not caused by breast cancer. Even so, if you develop any problem with your breasts, see a doctor or nurse to have it checked out.    Some common breast problems include:    ?Breast lumpiness    ?Single breast lump (which doctors call a "mass")    ?Breast pain    ?Breast tenderness    ?Nipple discharge (meaning fluid is leaks from the nipples, such as clear, white, yellow, green, or red fluid)    ?Nipple inversion (meaning the nipples point inward instead of outward) and that is new and has occurred in just one breast    ?Changes in the skin of the breast, such as redness or puckering    These problems can happen in women of all ages. If you develop any of these problems, see your doctor or nurse. Breast problems are not usually an emergency, but you should get checked out as soon as possible. If there is something serious going on, it's important to find out quickly. Your doctor or nurse might be able to tell what's happening just by doing an exam. If not, he or she can order some tests, or send you to a specialist.    Which tests might I need?  Your doctor or nurse will decide which tests you need based on your individual situation. Common tests used to evaluate breast problems are listed below:    ?Breast ultrasound – This is an imaging test that uses sound waves to create pictures of the inside of your breast. Among other things, it can show if a lump is solid or filled with fluid.    ?Breast biopsy – During a biopsy, a doctor takes one or more tiny samples of suspicious breast tissue using a needle. The samples then go to the lab to be checked for cancer or other problems.    ?Mammogram – Mammograms are special X-rays of the breast. They can help doctors find breast cancer.    What could be causing the problem?  The breast symptoms listed above could be caused by a number of problems, most of which are not serious. For example, normal hormone changes in a woman's monthly cycle can sometimes cause breast pain or even lumps that turn out to be nothing. Still, new breast symptoms can be a sign of cancer, so it's important to see a doctor if you develop any symptom.

## 2022-03-20 NOTE — ED PROVIDER NOTE - NS ED ROS FT
REVIEW OF SYSTEMS:    CONSTITUTIONAL: No weakness, fevers or chills  EYES/ENT: No visual changes;  No vertigo or throat pain   NECK: No pain or stiffness  RESPIRATORY: No cough, wheezing, hemoptysis; No shortness of breath  CARDIOVASCULAR: No chest pain or palpitations  GASTROINTESTINAL: No abdominal or epigastric pain. No nausea, vomiting, or hematemesis; No diarrhea or constipation. No melena or hematochezia.  GENITOURINARY: No dysuria, frequency or hematuria  MSK: Admits to left hand cramping. No bruising or swelling.   NEUROLOGICAL: No numbness or weakness  Breast: left breast pain with mass behind areola. Admits to nipple inversion.

## 2022-03-20 NOTE — ED PROVIDER NOTE - NSICDXFAMILYHX_GEN_ALL_CORE_FT
FAMILY HISTORY:  Sibling  Still living? Unknown  Family history of coronary artery bypass graft, Age at diagnosis: 51-60

## 2022-03-20 NOTE — ED PROVIDER NOTE - OBJECTIVE STATEMENT
51 year old female with a past medical history of hypothyroid presents to the ED with left breast pain. 2 weeks ago she noticed that her left hand and wrist was cramping. Last week she developed left breast pain and noted a mass behind her areola with nipple inversion. No overlying erythema, ecchymosis, edema. 51 year old female with a past medical history of hypothyroid presents to the ED with left breast pain. 2 weeks ago she noticed that her left hand and wrist was cramping. Last week she developed left breast pain and noted a mass behind her areola with nipple inversion. No overlying erythema, ecchymosis, edema. No nipple discharge.

## 2022-03-20 NOTE — ED ADULT NURSE NOTE - OBJECTIVE STATEMENT
pt c/o left breast "lump and inverted nipple, started about 4 days ago" pt denies fever/chills/discharge.

## 2022-03-20 NOTE — ED PROVIDER NOTE - NS ED ATTENDING STATEMENT MOD
This was a shared visit with the SUKHJINDRE. I reviewed and verified the documentation and independently performed the documented:

## 2022-03-20 NOTE — ED ADULT TRIAGE NOTE - CHIEF COMPLAINT QUOTE
"a couple days ago I notice a lump in my breast and all pain on my left side. I was also diagnosed with herpes 2 weeks ago and started valtrex I don't know if its related"

## 2022-03-20 NOTE — ED PROVIDER NOTE - PHYSICAL EXAMINATION
PHYSICAL EXAM:  GENERAL: NAD, lying in bed comfortably  HEAD:  Atraumatic, Normocephalic  EYES: EOMI, PERRLA, conjunctiva and sclera clear  ENT: Moist mucous membranes  NECK: Supple, No JVD  CHEST/LUNG: Clear to auscultation bilaterally; No rales, rhonchi, wheezing, or rubs. Unlabored respirations  HEART: Regular rate and rhythm; No murmurs, rubs, or gallops  ABDOMEN: Bowel sounds present; Soft, Nontender, Nondistended. No hepatomegally  EXTREMITIES:  2+ Peripheral Pulses, brisk capillary refill. No clubbing, cyanosis, or edema  NERVOUS SYSTEM:  Alert & Oriented X3, speech clear. No deficits   MSK: FROM all 4 extremities, full and equal strength. No edema.   BREAST: (Female PCT present for exam). Left breast without erythema or skin lesions. No dumpling. +nipple inversion. Firm and non mobile mass palpated behind left nipple.

## 2022-03-20 NOTE — ED ADULT NURSE NOTE - NSIMPLEMENTINTERV_GEN_ALL_ED
Implemented All Universal Safety Interventions:  [place] to call system. Call bell, personal items and telephone within reach. Instruct patient to call for assistance. Room bathroom lighting operational. Non-slip footwear when patient is off stretcher. Physically safe environment: no spills, clutter or unnecessary equipment. Stretcher in lowest position, wheels locked, appropriate side rails in place.

## 2022-03-20 NOTE — ED PROVIDER NOTE - CLINICAL SUMMARY MEDICAL DECISION MAKING FREE TEXT BOX
Patient presented with 2 weeks of L breast pain. Otherwise on arrival patient afebrile, HD stable. Exam showed (+) solid lump to L breast without evidence of infection or fluctuance. No overlying skin changes. Will provide outpatient breast clinic follow up for further work up as outpatient. Patient agreeable with plan. Agrees to return to ED for any new or worsening symptoms.

## 2022-03-21 ENCOUNTER — APPOINTMENT (OUTPATIENT)
Dept: OBGYN | Facility: CLINIC | Age: 52
End: 2022-03-21
Payer: COMMERCIAL

## 2022-03-21 VITALS
BODY MASS INDEX: 26.37 KG/M2 | SYSTOLIC BLOOD PRESSURE: 124 MMHG | HEIGHT: 67 IN | WEIGHT: 168 LBS | DIASTOLIC BLOOD PRESSURE: 85 MMHG | HEART RATE: 73 BPM

## 2022-03-21 DIAGNOSIS — N64.4 MASTODYNIA: ICD-10-CM

## 2022-03-21 DIAGNOSIS — N63.0 UNSPECIFIED LUMP IN UNSPECIFIED BREAST: ICD-10-CM

## 2022-03-21 PROCEDURE — 99213 OFFICE O/P EST LOW 20 MIN: CPT

## 2022-03-21 NOTE — PHYSICAL EXAM
[Chaperone Present] : A chaperone was present in the examining room during all aspects of the physical examination [Appropriately responsive] : appropriately responsive [Alert] : alert [No Acute Distress] : no acute distress [Oriented x3] : oriented x3 [Breast Palpation Diffuse Fibrous Tissue Bilateral] : fibrocystic changes [Rt > Lt] : the right breast was larger than the left [No Discharge] : no discharge [Dimpling Of The Left Breast] : dimpling [Enlargement Of The Left Breast] : swelling [Tenderness Of The Left Breast] : tenderness [Breast Nipple Inversion Left] : inverted

## 2022-03-21 NOTE — HISTORY OF PRESENT ILLNESS
[Localized Pain] : localized pain [Palpable Lump] : palpable lump [Moderate] : moderate [FreeTextEntry1] : pt c/o left breast pain for approx 1 week along with retraction of nipple\par has pain in left axilla, radiating down left arm, to left ribs

## 2022-03-24 ENCOUNTER — RESULT REVIEW (OUTPATIENT)
Age: 52
End: 2022-03-24

## 2022-03-24 ENCOUNTER — OUTPATIENT (OUTPATIENT)
Dept: OUTPATIENT SERVICES | Facility: HOSPITAL | Age: 52
LOS: 1 days | Discharge: HOME | End: 2022-03-24
Payer: COMMERCIAL

## 2022-03-24 DIAGNOSIS — Z90.49 ACQUIRED ABSENCE OF OTHER SPECIFIED PARTS OF DIGESTIVE TRACT: Chronic | ICD-10-CM

## 2022-03-24 DIAGNOSIS — Z98.890 OTHER SPECIFIED POSTPROCEDURAL STATES: Chronic | ICD-10-CM

## 2022-03-24 DIAGNOSIS — N63.20 UNSPECIFIED LUMP IN THE LEFT BREAST, UNSPECIFIED QUADRANT: ICD-10-CM

## 2022-03-24 DIAGNOSIS — N63.10 UNSPECIFIED LUMP IN THE RIGHT BREAST, UNSPECIFIED QUADRANT: ICD-10-CM

## 2022-03-24 PROCEDURE — 77066 DX MAMMO INCL CAD BI: CPT | Mod: 26

## 2022-03-24 PROCEDURE — 76641 ULTRASOUND BREAST COMPLETE: CPT | Mod: 26,50

## 2022-03-24 PROCEDURE — G0279: CPT | Mod: 26

## 2022-03-25 ENCOUNTER — NON-APPOINTMENT (OUTPATIENT)
Age: 52
End: 2022-03-25

## 2022-03-25 DIAGNOSIS — N63.20 UNSPECIFIED LUMP IN THE LEFT BREAST, UNSPECIFIED QUADRANT: ICD-10-CM

## 2022-03-25 DIAGNOSIS — R59.0 LOCALIZED ENLARGED LYMPH NODES: ICD-10-CM

## 2022-03-28 ENCOUNTER — OUTPATIENT (OUTPATIENT)
Dept: OUTPATIENT SERVICES | Facility: HOSPITAL | Age: 52
LOS: 1 days | Discharge: HOME | End: 2022-03-28
Payer: COMMERCIAL

## 2022-03-28 DIAGNOSIS — Z90.49 ACQUIRED ABSENCE OF OTHER SPECIFIED PARTS OF DIGESTIVE TRACT: Chronic | ICD-10-CM

## 2022-03-28 DIAGNOSIS — Z98.890 OTHER SPECIFIED POSTPROCEDURAL STATES: Chronic | ICD-10-CM

## 2022-03-28 DIAGNOSIS — C79.9 SECONDARY MALIGNANT NEOPLASM OF UNSPECIFIED SITE: ICD-10-CM

## 2022-03-28 PROCEDURE — 74177 CT ABD & PELVIS W/CONTRAST: CPT | Mod: 26

## 2022-03-28 PROCEDURE — 71260 CT THORAX DX C+: CPT | Mod: 26

## 2022-03-29 ENCOUNTER — RESULT REVIEW (OUTPATIENT)
Age: 52
End: 2022-03-29

## 2022-03-29 ENCOUNTER — OUTPATIENT (OUTPATIENT)
Dept: OUTPATIENT SERVICES | Facility: HOSPITAL | Age: 52
LOS: 1 days | Discharge: HOME | End: 2022-03-29
Payer: COMMERCIAL

## 2022-03-29 DIAGNOSIS — Z90.49 ACQUIRED ABSENCE OF OTHER SPECIFIED PARTS OF DIGESTIVE TRACT: Chronic | ICD-10-CM

## 2022-03-29 DIAGNOSIS — Z98.890 OTHER SPECIFIED POSTPROCEDURAL STATES: Chronic | ICD-10-CM

## 2022-03-29 PROCEDURE — 88305 TISSUE EXAM BY PATHOLOGIST: CPT | Mod: 26

## 2022-03-29 PROCEDURE — 19083 BX BREAST 1ST LESION US IMAG: CPT | Mod: LT

## 2022-03-29 PROCEDURE — 19084 BX BREAST ADD LESION US IMAG: CPT | Mod: LT

## 2022-03-29 PROCEDURE — 77065 DX MAMMO INCL CAD UNI: CPT | Mod: 26,LT

## 2022-03-29 PROCEDURE — 88360 TUMOR IMMUNOHISTOCHEM/MANUAL: CPT | Mod: 26

## 2022-03-30 LAB — SURGICAL PATHOLOGY STUDY: SIGNIFICANT CHANGE UP

## 2022-04-04 DIAGNOSIS — C50.912 MALIGNANT NEOPLASM OF UNSPECIFIED SITE OF LEFT FEMALE BREAST: ICD-10-CM

## 2022-04-04 DIAGNOSIS — Z17.0 ESTROGEN RECEPTOR POSITIVE STATUS [ER+]: ICD-10-CM

## 2022-04-04 DIAGNOSIS — N63.20 UNSPECIFIED LUMP IN THE LEFT BREAST, UNSPECIFIED QUADRANT: ICD-10-CM

## 2022-04-05 ENCOUNTER — NON-APPOINTMENT (OUTPATIENT)
Age: 52
End: 2022-04-05

## 2022-04-12 ENCOUNTER — APPOINTMENT (OUTPATIENT)
Dept: BREAST CENTER | Facility: CLINIC | Age: 52
End: 2022-04-12
Payer: COMMERCIAL

## 2022-04-12 VITALS
SYSTOLIC BLOOD PRESSURE: 139 MMHG | BODY MASS INDEX: 26.37 KG/M2 | HEIGHT: 67 IN | WEIGHT: 168 LBS | TEMPERATURE: 97.2 F | DIASTOLIC BLOOD PRESSURE: 92 MMHG

## 2022-04-12 DIAGNOSIS — Z86.79 PERSONAL HISTORY OF OTHER DISEASES OF THE CIRCULATORY SYSTEM: ICD-10-CM

## 2022-04-12 DIAGNOSIS — Z87.891 PERSONAL HISTORY OF NICOTINE DEPENDENCE: ICD-10-CM

## 2022-04-12 DIAGNOSIS — Z78.9 OTHER SPECIFIED HEALTH STATUS: ICD-10-CM

## 2022-04-12 DIAGNOSIS — Z86.39 PERSONAL HISTORY OF OTHER ENDOCRINE, NUTRITIONAL AND METABOLIC DISEASE: ICD-10-CM

## 2022-04-12 DIAGNOSIS — Z80.1 FAMILY HISTORY OF MALIGNANT NEOPLASM OF TRACHEA, BRONCHUS AND LUNG: ICD-10-CM

## 2022-04-12 PROCEDURE — 99204 OFFICE O/P NEW MOD 45 MIN: CPT

## 2022-04-12 NOTE — REVIEW OF SYSTEMS
[Abn Vaginal Bleeding] : unexplained vaginal bleeding [Skin Lesions] : no skin lesions [Skin Wound] : no skin wound [Breast Pain] : breast pain [Breast Lump] : breast lump [As Noted in HPI] : as noted in HPI [Easy Bruising] : a tendency for easy bruising [Negative] : Endocrine

## 2022-04-12 NOTE — HISTORY OF PRESENT ILLNESS
[FreeTextEntry1] : Chrissie is a 51 y/o pre menopausal F who presents with a left breast IDC, [id number], ER/AL (+), Her 2 (-), anatomic stage IIIA   AJCC 8th edition.\par \par She presents today with her sister in law, Theodora, who was present for the entirety of this consultation. \par \par She first L breast pain and weakness of her L arm.  She then noticed L nipple inversion and a palpable mass in the superior L breast.  She denies any nipple discharge and denies any right sided breast complaints. \par \par Her work-up was as follows:\par 2022 - B/L Dx Mammo & Sono:\par -The breasts are heterogeneously dense.\par -There is a stable biopsy-proven benign asymmetry in the upper outer quadrant posterior depth.\par -There is left nipple retraction with an associated retroareolar irregular mass which corresponds with suspicious mass seen on concurrent ultrasound.\par -Additional irregular mass is identified in the lower inner quadrant of the left breast, also corresponding with a suspicious mass seen on concurrent ultrasound.\par -There is diffuse skin thickening.\par Bilateral whole breast ultrasound was performed.\par Right breast:\par There is a benign cyst at the 9:00 position 5 cm from the nipple measuring 0.9 cm.\par Left breast:\par -There are 2 morphologically abnormal lymph nodes in the axilla, at the 1:00 position 13 cm from the nipple measuring 1.0 x 0.7 x 0.7 cm and 0.8 x 0.7 x 0.5 cm. \par -12:00 position 3 cm from the nipple, there is a irregular hypoechoic mass measuring 4.6 x 1.7 x 6.7 cm.\par -3:00 position 4 cm from the nipple, there are 2 indeterminate masses measuring 0.3 x 0.3 x 0.2 cm and 0.3 x 0.4 x 0.3 cm. \par -2:00 position 3 cm from the nipple, additional similar appearing mass is identified measuring 0.4 x 0.3 x 0.3 cm.\par -8:00 position 5 cm from the nipple, there is an irregular hypoechoic mass measuring 0.6 x 0.5 x 0.5 cm. \par BI-RADS Category 4: Suspicious\par \par \par 2022 - US Guided Core Bx:\par Left, 12:00 N3, 6.7cm: (top-hat)\par - Well to moderately differentiated IDC with focal micropapillary features and associated microca++.\par - DCIS, solid and cribriform types with comedo necrosis, intermediate nuclear grade.\par - Foci of perineural invasion are seen.\par ER  (+)  %\par AL  (+)  %\par HER2  (-)  0\par Ki-67   5-7%\par \par \par Left, 8:00 N5, 0.6cm: (stop-light)\par - Well to moderately differentiated IDC with focal micropapillary features and associated microca++.\par - DCIS, solid and cribriform types,intermediate nuclear grade.\par ER  (+)  %\par AL  (+)  %\par HER2  (-)  1+\par Ki-67   3-5% \par \par Left, axillary mass: (twirl)\par - Lymph node fragments with metastatic carcinoma (), largest contiguous focus of which measures 4.0 mm.\par - Foci of microscopic extracapsular extension are present in this biopsy material.\par \par HISTORICAL RISK FACTORS: \par -no prior breast biopsies or surgeries \par -no family history of breast or ovarian cancer \par -, age at first live birth was 31 \par -prior OCP use x 4-5 years \par -no gyn surgeries\par

## 2022-04-12 NOTE — PHYSICAL EXAM
[Normocephalic] : normocephalic [Atraumatic] : atraumatic [EOMI] : extra ocular movement intact [No Supraclavicular Adenopathy] : no supraclavicular adenopathy [No Cervical Adenopathy] : no cervical adenopathy [No dominant masses] : no dominant masses in right breast  [No Nipple Retraction] : no right nipple retraction [No Nipple Discharge] : no left nipple discharge [No Axillary Lymphadenopathy] : no right axillary lymphadenopathy [Soft] : abdomen soft [Not Tender] : non-tender [No Edema] : no edema [No Rashes] : no rashes [No Ulceration] : no ulceration [de-identified] : no suspicious abnormalities palpated  [de-identified] : in the superior breast, she has a 5 cm mass with some overlying skin thickening, @8N5, recent biopsy site changes present, but no underlying suspicious mass present; L nipple inversion present; 1 cm mobile palpable lymph node present [de-identified] : 1 cm mobile LN in axilla

## 2022-04-12 NOTE — PAST MEDICAL HISTORY
[Perimenopausal] : The patient is perimenopausal [Menarche Age ____] : age at menarche was [unfilled] [History of Hormone Replacement Treatment] : has no history of hormone replacement treatment [Definite ___ (Date)] : the last menstrual period was [unfilled] [Total Preg ___] : G[unfilled] [Live Births ___] : P[unfilled]  [Age At Live Birth ___] : Age at live birth: [unfilled] [FreeTextEntry5] : denies  [FreeTextEntry6] : denies [FreeTextEntry7] : yes in past x 4-5 years  [FreeTextEntry8] : denies

## 2022-04-12 NOTE — DATA REVIEWED
[FreeTextEntry1] : 03/24/2022 - B/L Dx Mammo & Sono:\par Breast composition:The breasts are heterogeneously dense, which may \par obscure small masses.\par \par Findings:\par \par Mammogram:\par \par No suspicious mass, microcalcifications or areas of architectural \par distortion is seen the right breast. There is a stable biopsy-proven \par benign asymmetry in the upper outer quadrant posterior depth.\par \par There is left nipple retraction with an associated retroareolar irregular \par mass which corresponds with suspicious mass seen on concurrent \par ultrasound.. Additional irregular mass is identified in the lower inner \par quadrant of the left breast, also corresponding with a suspicious mass \par seen on concurrent ultrasound.. There is diffuse skin thickening.\par \par Ultrasound:\par \par Bilateral whole breast ultrasound was performed.\par \par Right breast:\par No suspicious solid or cystic masses. No axillary adenopathy.\par \par There is a benign cyst at the 9:00 position 5 cm from the nipple \par measuring 0.9 cm.\par \par Left breast:\par There are 2 morphologically abnormal lymph nodes in the axilla, at the \par 1:00 position 13 cm from the nipple measuring 1.0 x 0.7 x 0.7 cm and 0.8 \par x 0.7 x 0.5 cm. Ultrasound-guided biopsy of one of these nodes is \par recommended.\par \par At the area of clinical concern, at the 12:00 position 3 cm from the \par nipple, there is a irregular hypoechoic mass measuring 4.6 x 1.7 x 6.7 \par cm. Ultrasound-guided biopsy is recommended.\par \par At the 3:00 position 4 cm from the nipple, there are 2 indeterminate \par masses measuring 0.3 x 0.3 x 0.2 cm and 0.3 x 0.4 x 0.3 cm. At the 2:00 \par position 3 cm from the nipple, additional similar appearing mass is \par identified measuring 0.4 x 0.3 x 0.3 cm.\par \par At the 8:00 position 5 cm from the nipple, there is an irregular \par hypoechoic mass measuring 0.6 x 0.5 x 0.5 cm. This corresponds with \par mammographic findings. Ultrasound guided biopsy is recommended.\par \par Impression: No mammographic or sonographic evidence of malignancy of the \par right breast. Suspicious left breast masses and axillary adenopathy for \par which ultrasound-guided biopsy of the dominant mass at the 12:00 \par position, additional mass at the 8:00 position and one of the axillary \par lymph nodes is recommended.\par \par Recommendation: Ultrasound guided biopsy.\par \par BI-RADS Category 4: Suspicious\par \par \par 03/29/2022 - US Guided Core Bx:\par Left, 12:00 N3, 6.7cm: (top-hat)\par - Invasive well to moderately differentiated ductal carcinoma with focal\par micropapillary features and associated microcalcifications.\par - Ductal carcinoma in-situ (DCIS), solid and cribriform types with comedo\par necrosis, intermediate nuclear grade.\par - Foci of perineural invasion are seen.\par ER  (+)  %\par DC  (+)  %\par HER2  (-)  0\par Ki-67   5-7%\par \par \par Left, 8:00 N5, 0.6cm: (stop-light)\par - Invasive well to moderately differentiated ductal carcinoma with focal\par micropapillary features and associated microcalcifications.\par - Ductal carcinoma in-situ (DCIS), solid and cribriform types,\par intermediate nuclear grade.\par ER  (+)  %\par DC  (+)  %\par HER2  (-)  1+\par Ki-67   3-5% \par \par Left, axillary mass: (twirl)\par - Lymph node fragments with metastatic carcinoma (1/1), the largest\par contiguous focus of which measures 4.0 mm.\par - Foci of microscopic extracapsular extension are present in this biopsy\par material.

## 2022-04-12 NOTE — REASON FOR VISIT
[Initial Evaluation] : an initial evaluation [FreeTextEntry1] : Surgical consultation for left breast IDC, [id number], ER/[place] (+), Her 2 (-), anatomic stage IIIA   AJCC 8th edition.

## 2022-04-12 NOTE — ASSESSMENT
[FreeTextEntry1] : Chrissie is a 51 y/o pre menopausal F who presents with a left breast IDC, [id number], ER/NE (+), Her 2 (-), anatomic stage IIIA   AJCC 8th edition.\par \par ON exam, in her left breast, in the superior breast, she has a 5 cm mass with some overlying skin thickening, @8N5, recent biopsy site changes present, but no underlying suspicious mass present; L nipple inversion present; 1 cm mobile palpable lymph node present.  NO suspicious abnormalities were palpated within her right breast. \par \par Her most recent imaging was a b/l dx mammogram and US on 3/24/2022 which revealed in her right breast @9N5, a benign cyst, measuring 0.9 cm, and in her left breast, she had the following findings: \par -There are 2 morphologically abnormal lymph nodes in the axilla, at the 1:00 position 13 cm from the nipple measuring 1.0 x 0.7 x 0.7 cm and 0.8 x 0.7 x 0.5 cm --> one of which was biopsy proven metastatic carcinoma\par -12:00 position 3 cm from the nipple, there is a irregular hypoechoic mass measuring 4.6 x 1.7 x 6.7 cm --> biopsy proven cancer \par -3:00 position 4 cm from the nipple, there are 2 indeterminate masses measuring 0.3 x 0.3 x 0.2 cm and 0.3 x 0.4 x 0.3 cm. \par -2:00 position 3 cm from the nipple, additional similar appearing mass is identified measuring 0.4 x 0.3 x 0.3 cm.\par -8:00 position 5 cm from the nipple, there is an irregular hypoechoic mass measuring 0.6 x 0.5 x 0.5 cm. --> biopsy proven cancer \par \par  To further delineate the extent of her disease, I would like to obtain a bilateral breast MRI.  \par \par She will likely need systemic chemotherapy given the size of the tumor.  I would like to send an oncotype to determine how well she will respond to chemotherapy. \par \par However, we discussed the option of giving chemotherapy before or after surgery.  If given before surgery, this is considered neoadjuvant chemotherapy.  The benefits of undergoing neoadjuvant chemotherapy is that it will provide us with better local control of her breast cancer especially if she has a good pathologic response.  If she has a complete clinical response in her breast and lymph nodes, we may be able to avoid a mastectomy which would reduce the morbidity of her surgery.  In addition, it gives us prognostic information regarding her tumor response to chemotherapy.  Patients who have a complete pathologic response (pCR) were found to have an improved prognosis compared to women who do not have a pCR.  \par \par However, prior to starting chemotherapy, and given the locally advanced nature of her disease, I would also like to obtain staging scans with a PET/CT. \par \par She did have a CT C/A/P which revealed a stable lung nodule, and adnexal cyst, but otherwise was negative for metastatic disease. \par \par In regards to her treatment options: \par -If she were to pursue surgery upfront, she would need a right breast mastectomy and axillary lymph node dissection.  She would have the option of undergoing immediate vs. delayed reconstruction with a mastectomy.  However, this may be revisited following her breast MRI and following neoadjuvant chemotherapy.\par \par In regards to radiation therapy, depending on her margins, involvement of her pectoralis muscle, and number of lymph nodes positive for disease, she may need post mastectomy radiation.  \par \par At the completion of all these therapy, she will need endocrine therapy.  At this time, she will be recommended for tamoxifen as she is currently perimenopausal for a total of 5 years, to reduce her risk of recurrence by at least 50%. \par \par Per ASBrS consensus guidelines, any patient with a diagnosis of breast cancer should be offered panel genetic testing as 10% of these patients were found to have a mutation.  THis was offered to her and she would like to proceed with panel genetic testing.  Her blood was drawn today. \par \par All of her questions were answered.  She knows to call with any further questions or concerns. \par \par PLAN: \par -bone scan vs. PET/CT \par -will discuss with medical oncology regarding need for breast MRI vs. CT scan of head given her symptoms of tinnitus, migraines, neurologic symptoms\par -SOZO: \par -INVITAE panel genetic testing -- blood drawn today \par -ONCOTYPE\par -medical oncology referral for discussion NAC \par -f/up after \par

## 2022-04-13 ENCOUNTER — APPOINTMENT (OUTPATIENT)
Dept: HEMATOLOGY ONCOLOGY | Facility: CLINIC | Age: 52
End: 2022-04-13
Payer: COMMERCIAL

## 2022-04-13 ENCOUNTER — LABORATORY RESULT (OUTPATIENT)
Age: 52
End: 2022-04-13

## 2022-04-13 VITALS
SYSTOLIC BLOOD PRESSURE: 135 MMHG | OXYGEN SATURATION: 98 % | WEIGHT: 170 LBS | RESPIRATION RATE: 14 BRPM | DIASTOLIC BLOOD PRESSURE: 90 MMHG | BODY MASS INDEX: 27.32 KG/M2 | HEART RATE: 61 BPM | HEIGHT: 66 IN | TEMPERATURE: 98.1 F

## 2022-04-13 DIAGNOSIS — Z51.11 ENCOUNTER FOR ANTINEOPLASTIC CHEMOTHERAPY: ICD-10-CM

## 2022-04-13 DIAGNOSIS — F41.9 ANXIETY DISORDER, UNSPECIFIED: ICD-10-CM

## 2022-04-13 PROCEDURE — 99205 OFFICE O/P NEW HI 60 MIN: CPT

## 2022-04-13 RX ORDER — ALPRAZOLAM 0.5 MG/1
0.5 TABLET ORAL
Qty: 30 | Refills: 0 | Status: ACTIVE | COMMUNITY
Start: 2022-04-13 | End: 1900-01-01

## 2022-04-14 ENCOUNTER — NON-APPOINTMENT (OUTPATIENT)
Age: 52
End: 2022-04-14

## 2022-04-17 PROBLEM — Z51.11 ENCOUNTER FOR CHEMOTHERAPY MANAGEMENT: Status: ACTIVE | Noted: 2022-04-17

## 2022-04-19 ENCOUNTER — OUTPATIENT (OUTPATIENT)
Dept: OUTPATIENT SERVICES | Facility: HOSPITAL | Age: 52
LOS: 1 days | Discharge: HOME | End: 2022-04-19
Payer: COMMERCIAL

## 2022-04-19 ENCOUNTER — LABORATORY RESULT (OUTPATIENT)
Age: 52
End: 2022-04-19

## 2022-04-19 VITALS
SYSTOLIC BLOOD PRESSURE: 138 MMHG | TEMPERATURE: 98 F | RESPIRATION RATE: 16 BRPM | OXYGEN SATURATION: 98 % | HEART RATE: 72 BPM | WEIGHT: 149.91 LBS | HEIGHT: 66 IN | DIASTOLIC BLOOD PRESSURE: 92 MMHG

## 2022-04-19 DIAGNOSIS — C50.812 MALIGNANT NEOPLASM OF OVERLAPPING SITES OF LEFT FEMALE BREAST: ICD-10-CM

## 2022-04-19 DIAGNOSIS — Z01.818 ENCOUNTER FOR OTHER PREPROCEDURAL EXAMINATION: ICD-10-CM

## 2022-04-19 DIAGNOSIS — Z98.890 OTHER SPECIFIED POSTPROCEDURAL STATES: Chronic | ICD-10-CM

## 2022-04-19 DIAGNOSIS — Z90.49 ACQUIRED ABSENCE OF OTHER SPECIFIED PARTS OF DIGESTIVE TRACT: Chronic | ICD-10-CM

## 2022-04-19 LAB
ALBUMIN SERPL ELPH-MCNC: 4.4 G/DL — SIGNIFICANT CHANGE UP (ref 3.5–5.2)
ALP SERPL-CCNC: 92 U/L — SIGNIFICANT CHANGE UP (ref 30–115)
ALT FLD-CCNC: 10 U/L — SIGNIFICANT CHANGE UP (ref 0–41)
ANION GAP SERPL CALC-SCNC: 13 MMOL/L — SIGNIFICANT CHANGE UP (ref 7–14)
APTT BLD: 39.2 SEC — SIGNIFICANT CHANGE UP (ref 27–39.2)
AST SERPL-CCNC: 14 U/L — SIGNIFICANT CHANGE UP (ref 0–41)
BASOPHILS # BLD AUTO: 0.09 K/UL — SIGNIFICANT CHANGE UP (ref 0–0.2)
BASOPHILS NFR BLD AUTO: 0.7 % — SIGNIFICANT CHANGE UP (ref 0–1)
BILIRUB SERPL-MCNC: 0.3 MG/DL — SIGNIFICANT CHANGE UP (ref 0.2–1.2)
BUN SERPL-MCNC: 13 MG/DL — SIGNIFICANT CHANGE UP (ref 10–20)
CALCIUM SERPL-MCNC: 9.5 MG/DL — SIGNIFICANT CHANGE UP (ref 8.5–10.1)
CHLORIDE SERPL-SCNC: 102 MMOL/L — SIGNIFICANT CHANGE UP (ref 98–110)
CO2 SERPL-SCNC: 23 MMOL/L — SIGNIFICANT CHANGE UP (ref 17–32)
CREAT SERPL-MCNC: 0.8 MG/DL — SIGNIFICANT CHANGE UP (ref 0.7–1.5)
EGFR: 89 ML/MIN/1.73M2 — SIGNIFICANT CHANGE UP
EOSINOPHIL # BLD AUTO: 0.13 K/UL — SIGNIFICANT CHANGE UP (ref 0–0.7)
EOSINOPHIL NFR BLD AUTO: 1 % — SIGNIFICANT CHANGE UP (ref 0–8)
GLUCOSE SERPL-MCNC: 103 MG/DL — HIGH (ref 70–99)
HCT VFR BLD CALC: 41 % — SIGNIFICANT CHANGE UP (ref 37–47)
HGB BLD-MCNC: 13.3 G/DL — SIGNIFICANT CHANGE UP (ref 12–16)
IMM GRANULOCYTES NFR BLD AUTO: 0.3 % — SIGNIFICANT CHANGE UP (ref 0.1–0.3)
INR BLD: 0.96 RATIO — SIGNIFICANT CHANGE UP (ref 0.65–1.3)
LYMPHOCYTES # BLD AUTO: 26.1 % — SIGNIFICANT CHANGE UP (ref 20.5–51.1)
LYMPHOCYTES # BLD AUTO: 3.28 K/UL — SIGNIFICANT CHANGE UP (ref 1.2–3.4)
MCHC RBC-ENTMCNC: 31.7 PG — HIGH (ref 27–31)
MCHC RBC-ENTMCNC: 32.4 G/DL — SIGNIFICANT CHANGE UP (ref 32–37)
MCV RBC AUTO: 97.6 FL — SIGNIFICANT CHANGE UP (ref 81–99)
MONOCYTES # BLD AUTO: 0.95 K/UL — HIGH (ref 0.1–0.6)
MONOCYTES NFR BLD AUTO: 7.6 % — SIGNIFICANT CHANGE UP (ref 1.7–9.3)
NEUTROPHILS # BLD AUTO: 8.09 K/UL — HIGH (ref 1.4–6.5)
NEUTROPHILS NFR BLD AUTO: 64.3 % — SIGNIFICANT CHANGE UP (ref 42.2–75.2)
NRBC # BLD: 0 /100 WBCS — SIGNIFICANT CHANGE UP (ref 0–0)
PLATELET # BLD AUTO: 377 K/UL — SIGNIFICANT CHANGE UP (ref 130–400)
POTASSIUM SERPL-MCNC: 4.8 MMOL/L — SIGNIFICANT CHANGE UP (ref 3.5–5)
POTASSIUM SERPL-SCNC: 4.8 MMOL/L — SIGNIFICANT CHANGE UP (ref 3.5–5)
PROT SERPL-MCNC: 7 G/DL — SIGNIFICANT CHANGE UP (ref 6–8)
PROTHROM AB SERPL-ACNC: 11 SEC — SIGNIFICANT CHANGE UP (ref 9.95–12.87)
RBC # BLD: 4.2 M/UL — SIGNIFICANT CHANGE UP (ref 4.2–5.4)
RBC # FLD: 13.3 % — SIGNIFICANT CHANGE UP (ref 11.5–14.5)
SODIUM SERPL-SCNC: 138 MMOL/L — SIGNIFICANT CHANGE UP (ref 135–146)
WBC # BLD: 12.58 K/UL — HIGH (ref 4.8–10.8)
WBC # FLD AUTO: 12.58 K/UL — HIGH (ref 4.8–10.8)

## 2022-04-19 PROCEDURE — 93010 ELECTROCARDIOGRAM REPORT: CPT

## 2022-04-19 RX ORDER — ALPRAZOLAM 0.25 MG
1 TABLET ORAL
Qty: 0 | Refills: 0 | DISCHARGE

## 2022-04-19 RX ORDER — LEVOTHYROXINE SODIUM 125 MCG
1 TABLET ORAL
Qty: 0 | Refills: 0 | DISCHARGE

## 2022-04-19 NOTE — H&P PST ADULT - HISTORY OF PRESENT ILLNESS
52 YR old female states found a lump on LT breast with dimpling -with  biopsy confirmed left breast invasive ductal carcinoma-is scheduled for Port placement. Will  start chemo next week. Denies COVID S/S. Recd 2 doses of the vaccine. Verbalized understanding of COVID prevention measures. Exercise kaylie 3-4 FOS.  Anesthesia Alert  NO--Difficult Airway  NO--History of neck surgery or radiation  NO--Limited ROM of neck  NO--History of Malignant hyperthermia  NO--Personal or family history of Pseudocholinesterase deficiency  NO--Prior Anesthesia Complication  YES--Latex Allergy  NO--Loose teeth  NO--History of Rheumatoid Arthritis  NO--YUE  No Bleeding risk  NO--Other_____

## 2022-04-19 NOTE — H&P PST ADULT - NSICDXPASTMEDICALHX_GEN_ALL_CORE_FT
PAST MEDICAL HISTORY:  Breast cancer LT    Hypertension Off meds    Hypothyroid      PAST MEDICAL HISTORY:  2019 novel coronavirus disease (COVID-19) 8/2021, 12/2021    Breast cancer LT    Hypertension Off meds    Hypothyroid

## 2022-04-20 LAB
ALBUMIN SERPL ELPH-MCNC: 4.4 G/DL
ALP BLD-CCNC: 87 U/L
ALT SERPL-CCNC: 11 U/L
ANION GAP SERPL CALC-SCNC: 9 MMOL/L
AST SERPL-CCNC: 13 U/L
BILIRUB SERPL-MCNC: 0.3 MG/DL
BUN SERPL-MCNC: 9 MG/DL
CALCIUM SERPL-MCNC: 9.2 MG/DL
CANCER AG15-3 SERPL-ACNC: 20.6 U/ML
CEA SERPL-MCNC: 3 NG/ML
CHLORIDE SERPL-SCNC: 104 MMOL/L
CO2 SERPL-SCNC: 26 MMOL/L
CREAT SERPL-MCNC: 0.8 MG/DL
EGFR: 89 ML/MIN/1.73M2
GLUCOSE SERPL-MCNC: 68 MG/DL
HCT VFR BLD CALC: 38.6 %
HGB BLD-MCNC: 13 G/DL
MCHC RBC-ENTMCNC: 32.9 PG
MCHC RBC-ENTMCNC: 33.7 G/DL
MCV RBC AUTO: 97.7 FL
PLATELET # BLD AUTO: 337 K/UL
PMV BLD: 9 FL
POTASSIUM SERPL-SCNC: 5 MMOL/L
PROT SERPL-MCNC: 6.7 G/DL
RBC # BLD: 3.95 M/UL
RBC # FLD: 13.7 %
SODIUM SERPL-SCNC: 139 MMOL/L
WBC # FLD AUTO: 11.52 K/UL

## 2022-04-20 NOTE — CONSULT LETTER
[Dear  ___] : Dear  [unfilled], [Consult Letter:] : I had the pleasure of evaluating your patient, [unfilled]. [Please see my note below.] : Please see my note below. [Consult Closing:] : Thank you very much for allowing me to participate in the care of this patient.  If you have any questions, please do not hesitate to contact me. [Sincerely,] : Sincerely, [FreeTextEntry3] : Phillip Helms MD

## 2022-04-20 NOTE — ASSESSMENT
[FreeTextEntry1] : 51 y/o pre menopausal female with new diagnosis of Lt breast invasive well to moderately differentiated ductal carcinoma, ER/HI positive and Her-2 negative, s/p biopsy.\par Clinical stage: [id number].\par \par Assessment and Plan:\par We had a detailed discussion regarding her diagnosis, biopsy, imaging results and treatment plan. The pathology reports were reviewed. Chrissie has hormone receptor positive and Her-2 negative breast cancer with large tumor mass 6.7cm at 12'o clock position and 0.6cm mass at 8'o clock position and positive Lt axillary adenopathy. She is at high risk for distant metastasis. CT CAP did not show evidence of metastatic disease. A PET/CT was ordered and is pending for insurance approval. Will order bone scan if PET is declined. She complains headache and will be referred for MRI head with and without contrast.. She was also referred for b/l breast MRI to further evaluate extent of disease. \par \par If her workup is negative for metastatic disease, she would benefit from neoadjuvant chemotherapy to downstage tumor and improve surgical outcome. She may avoid axillary lymph node dissection if she has good response to chemotherapy and the clipped positive lymph node as well as SLN are negative for cancer at the time of her surgery. \par \par We discussed the chemotherapy regimen. Dose-dense Adriamycin and Cytoxan given every 2 weeks for 4 cycles followed by Taxol weekly x 12 is recommended. Neulasta injection will be given after dose-dense AC to prevent neutropenia. \par \par We reviewed side effects and toxicity of chemotherapy. Adriamycin is associated with cardiac toxicity, may cause cardiomyopathy and decreased heart function. It is also associated with small risk of developing leukemia. Chemotherapy can cause bone marrow suppression, cytopenia, increased risk of infection, nausea, vomiting, diarrhea, fatigue, alopecia, infusional reaction, peripheral neuropathy, risk of infusion reaction. \par \par She will be referred for 2D Echo to evaluate LVEF.  We also discussed to have port catheter placement for chemotherapy. Will refer her to IR for the procedure.\par \par We discussed adjuvant endocrine therapy. In light of estrogen receptor positive breast cancer, she will be benefit with adjuvant endocrine therapy. The choice of adjuvant endocrine therapy in premenopausal female include Tamoxifen, Tamoxifen with OFS or an aromatase inhibitor plus OFS. Given large tumor and positive lymph node, AI with OFS is recommended. OFS can be achieved by Zoladex injection monthly or prophylactic BSO. We will discuss these options in more detail before she starts treatment. \par \par Chrissie comprehended information well. She agrees with chemotherapy.  All questions were answered appropriately. Will schedule her to start chemo once above tests and procedure are done. \par \par Will order additional blood work today: BMP, LFT, CEA, Ca15.3\par \par RTO for followup in 2 weeks \par \par Patient was seen and examined and discussed with Dr Helms. \par \par \par \par \par \par

## 2022-04-20 NOTE — HISTORY OF PRESENT ILLNESS
[de-identified] : 51 y/o premenopausal female with PMH of HTN, hypothyroid, DLD, GERD was referred by Dr Villavicencio for consultation of newly diagnosed left breast invasive ductal carcinoma. \par \par Patient noticed Lt breast fullness and pain a couple of months ago that was coinciding with her periods so she did not think too much of it. But about a month ago she noticed the Lt breast pain was sharp and severe with pain/weakness of her L arm. She then noticed L nipple inversion and a palpable mass in the superior L breast. She denies any nipple discharge and denies any right sided breast complaints. Her previous screening mammo in Dec 2020 did not show any abnormality in the Lt breast. \par \par On 3/24/22 she underwent b/l mammogram and US: \par Right breast: There is a benign cyst at the 9:00 position 5 cm from the nipple measuring 0.9 cm.\par Left breast:  - There are 2 morphologically abnormal lymph nodes in the axilla, at the 1:00 position 13 cm from the nipple measuring 1.0 x 0.7 x 0.7 cm and 0.8 x 0.7 x 0.5 cm. \par -12:00 position 3 cm from the nipple, there is a irregular hypoechoic mass measuring 4.6 x 1.7 x 6.7 cm.\par -3:00 position 4 cm from the nipple, there are 2 indeterminate masses measuring 0.3 x 0.3 x 0.2 cm and 0.3 x 0.4 x 0.3 cm. \par -2:00 position 3 cm from the nipple, additional similar appearing mass is identified measuring 0.4 x 0.3 x 0.3 cm.\par -8:00 position 5 cm from the nipple, there is an irregular hypoechoic mass measuring 0.6 x 0.5 x 0.5 cm. \par BI-RADS Category 4: Suspicious\par \par On 3/29/22, she underwent US guided core needle biopsies:\par - Left, 12:00 N3, 6.7cm: (top-hat) Well to moderately differentiated IDC with focal micropapillary features and associated microca++., DCIS, solid and cribriform types with comedo necrosis, intermediate nuclear grade.Foci of perineural invasion are seen.ER (+) %, RI (+) %, HER2 (-) 0, Ki-67 5-7%\par \par - Left, 8:00 N5, 0.6cm: (stop-light)- Well to moderately differentiated IDC with focal micropapillary features and associated microca++.- DCIS, solid and cribriform types,intermediate nuclear grade.ER (+) % RI (+) % HER2 (-) 1+ Ki-67 3-5% \par \par - Left, axillary mass: (twirl) - Lymph node fragments with metastatic carcinoma, largest contiguous focus of which measures 4.0 mm.- Foci of microscopic extracapsular extension are present in this biopsy material.\par \par She also underwent CT CAP that did not show any metastatic disease. 2.2 cm right adnexal hypodensity was seen and may represent a cyst. Follow up pelvic US was recommended.\par \par She saw Dr Villavicencio for surgical consultation and was recommended to have Neoadjuvant chemotherapy and followed by surgery.  PET/Ct and MRI b/l breast were ordered and are pending for insurance authorization. Patient feels overwhelmed with her diagnosis but otherwise has no weight loss, HONG, new bone/back pain, abdominal pain. She has c/o headaches which happens to her from her prior sinus problems. No prior cardiac problems. \par \par She is , premenopause, LMP in 2022. She has no family h/o breast or ovarian cancer. Her mother had lung cancer. She works as a  in a restaurant. \par \par \par

## 2022-04-20 NOTE — PHYSICAL EXAM
[Fully active, able to carry on all pre-disease performance without restriction] : Status 0 - Fully active, able to carry on all pre-disease performance without restriction [Normal] : normal appearance, no rash, nodules, vesicles, ulcers, erythema [de-identified] : Lt breast: About 6 cm palpable mass noted in the 12'o clock position with no overlying skin changes. Another palpable about 1 cm mass noted at 8'o clock position. Nipple is inverted. Axillary adenopathy is not palpable. Rt Breast: No palpable mass or axillary adenopathy.

## 2022-04-22 ENCOUNTER — OUTPATIENT (OUTPATIENT)
Dept: OUTPATIENT SERVICES | Facility: HOSPITAL | Age: 52
LOS: 1 days | Discharge: HOME | End: 2022-04-22

## 2022-04-22 ENCOUNTER — RESULT REVIEW (OUTPATIENT)
Age: 52
End: 2022-04-22

## 2022-04-22 DIAGNOSIS — C50.812 MALIGNANT NEOPLASM OF OVERLAPPING SITES OF LEFT FEMALE BREAST: ICD-10-CM

## 2022-04-22 DIAGNOSIS — Z98.890 OTHER SPECIFIED POSTPROCEDURAL STATES: Chronic | ICD-10-CM

## 2022-04-22 DIAGNOSIS — Z45.2 ENCOUNTER FOR ADJUSTMENT AND MANAGEMENT OF VASCULAR ACCESS DEVICE: ICD-10-CM

## 2022-04-22 DIAGNOSIS — Z90.49 ACQUIRED ABSENCE OF OTHER SPECIFIED PARTS OF DIGESTIVE TRACT: Chronic | ICD-10-CM

## 2022-04-22 PROBLEM — C50.919 MALIGNANT NEOPLASM OF UNSPECIFIED SITE OF UNSPECIFIED FEMALE BREAST: Chronic | Status: ACTIVE | Noted: 2022-04-19

## 2022-04-22 PROBLEM — U07.1 COVID-19: Chronic | Status: ACTIVE | Noted: 2022-04-19

## 2022-04-22 PROBLEM — I10 ESSENTIAL (PRIMARY) HYPERTENSION: Chronic | Status: ACTIVE | Noted: 2018-03-01

## 2022-04-22 LAB — HCG UR QL: NEGATIVE — SIGNIFICANT CHANGE UP

## 2022-04-22 NOTE — ED PROVIDER NOTE - TOBACCO USE
Never smoker Bilobed Transposition Flap Text: The defect edges were debeveled with a #15 scalpel blade.  Given the location of the defect and the proximity to free margins a bilobed transposition flap was deemed most appropriate.  Using a sterile surgical marker, an appropriate bilobe flap drawn around the defect.    The area thus outlined was incised deep to adipose tissue with a #15 scalpel blade.  The skin margins were undermined to an appropriate distance in all directions utilizing iris scissors.

## 2022-04-22 NOTE — PRE-ANESTHESIA EVALUATION ADULT - NSATTENDATTESTRD_GEN_ALL_CORE
The patient has been re-examined and I agree with the above assessment or I updated with my findings. Never smoker

## 2022-04-22 NOTE — CHART NOTE - NSCHARTNOTEFT_GEN_A_CORE
PACU ANESTHESIA PACU ADMISSION NOTE      Procedure:  Post op diagnosis    ____ Intubated  TV:______       Rate: ______      FiO2: ______    __x__ Patent Airway    ____ Full return of protective reflexes    ____ Full recovery from anesthesia / sedation to baseline status    Viitals:  see anesthesia record            Mental Status:  xx____ Awake   _____ Alert   _____ Drowsy   _____ Sedated    Nausea/Vomiting: ____ Yes, See Post - Op Orders      _x___ No    Pain Scale (0-10): _____    Treatment: ____ None    ___x_ See Post - Op/PCA Orders    Post - Operative Fluids:   ____ Oral   _x___ See Post - Op Orders    Plan:         Discharge:   _x___Home       _____Floor         _____Critical Care    _____Other:_________________    Comments: uneventful perioperative course;  no s/s of anesthesia complications noted; D/C home when criteria met

## 2022-04-22 NOTE — PRE-ANESTHESIA EVALUATION ADULT - NSANTHOSAYNRD_GEN_A_CORE
No. YUE screening performed.  STOP BANG Legend: 0-2 = LOW Risk; 3-4 = INTERMEDIATE Risk; 5-8 = HIGH Risk
No. YUE screening performed.  STOP BANG Legend: 0-2 = LOW Risk; 3-4 = INTERMEDIATE Risk; 5-8 = HIGH Risk

## 2022-04-25 ENCOUNTER — RESULT REVIEW (OUTPATIENT)
Age: 52
End: 2022-04-25

## 2022-04-25 ENCOUNTER — OUTPATIENT (OUTPATIENT)
Dept: OUTPATIENT SERVICES | Facility: HOSPITAL | Age: 52
LOS: 1 days | Discharge: HOME | End: 2022-04-25
Payer: COMMERCIAL

## 2022-04-25 DIAGNOSIS — C50.812 MALIGNANT NEOPLASM OF OVERLAPPING SITES OF LEFT FEMALE BREAST: ICD-10-CM

## 2022-04-25 DIAGNOSIS — Z90.49 ACQUIRED ABSENCE OF OTHER SPECIFIED PARTS OF DIGESTIVE TRACT: Chronic | ICD-10-CM

## 2022-04-25 DIAGNOSIS — Z98.890 OTHER SPECIFIED POSTPROCEDURAL STATES: Chronic | ICD-10-CM

## 2022-04-25 PROCEDURE — 77049 MRI BREAST C-+ W/CAD BI: CPT | Mod: 26

## 2022-04-26 ENCOUNTER — NON-APPOINTMENT (OUTPATIENT)
Age: 52
End: 2022-04-26

## 2022-04-27 ENCOUNTER — APPOINTMENT (OUTPATIENT)
Dept: INTERVENTIONAL RADIOLOGY/VASCULAR | Facility: CLINIC | Age: 52
End: 2022-04-27

## 2022-04-27 ENCOUNTER — APPOINTMENT (OUTPATIENT)
Dept: INFUSION THERAPY | Facility: CLINIC | Age: 52
End: 2022-04-27
Payer: COMMERCIAL

## 2022-04-27 ENCOUNTER — APPOINTMENT (OUTPATIENT)
Dept: HEMATOLOGY ONCOLOGY | Facility: CLINIC | Age: 52
End: 2022-04-27
Payer: COMMERCIAL

## 2022-04-27 VITALS
WEIGHT: 176 LBS | DIASTOLIC BLOOD PRESSURE: 95 MMHG | TEMPERATURE: 98.5 F | SYSTOLIC BLOOD PRESSURE: 140 MMHG | HEART RATE: 68 BPM | HEIGHT: 66 IN | BODY MASS INDEX: 28.28 KG/M2

## 2022-04-27 PROCEDURE — 99215 OFFICE O/P EST HI 40 MIN: CPT

## 2022-04-27 PROCEDURE — XXXXX: CPT | Mod: 1L

## 2022-04-27 RX ORDER — FOSAPREPITANT DIMEGLUMINE 150 MG/5ML
150 INJECTION, POWDER, LYOPHILIZED, FOR SOLUTION INTRAVENOUS ONCE
Refills: 0 | Status: COMPLETED | OUTPATIENT
Start: 2022-04-27 | End: 2022-04-27

## 2022-04-27 RX ORDER — DEXAMETHASONE 0.5 MG/5ML
12 ELIXIR ORAL ONCE
Refills: 0 | Status: COMPLETED | OUTPATIENT
Start: 2022-04-27 | End: 2022-04-27

## 2022-04-27 RX ORDER — ONDANSETRON 8 MG/1
8 TABLET ORAL
Qty: 30 | Refills: 1 | Status: ACTIVE | COMMUNITY
Start: 2022-04-27 | End: 1900-01-01

## 2022-04-27 RX ORDER — PEGFILGRASTIM-CBQV 6 MG/.6ML
6 INJECTION, SOLUTION SUBCUTANEOUS ONCE
Refills: 0 | Status: COMPLETED | OUTPATIENT
Start: 2022-04-27 | End: 2022-04-27

## 2022-04-27 RX ORDER — CYCLOPHOSPHAMIDE 100 MG
1120 VIAL (EA) INTRAVENOUS ONCE
Refills: 0 | Status: COMPLETED | OUTPATIENT
Start: 2022-04-27 | End: 2022-04-27

## 2022-04-27 RX ORDER — ONDANSETRON 8 MG/1
8 TABLET ORAL EVERY 8 HOURS
Qty: 90 | Refills: 3 | Status: ACTIVE | COMMUNITY
Start: 2022-04-27 | End: 1900-01-01

## 2022-04-27 RX ORDER — PROCHLORPERAZINE MALEATE 10 MG/1
10 TABLET ORAL EVERY 6 HOURS
Qty: 120 | Refills: 1 | Status: ACTIVE | COMMUNITY
Start: 2022-04-27 | End: 1900-01-01

## 2022-04-27 RX ORDER — LIDOCAINE AND PRILOCAINE 25; 25 MG/G; MG/G
2.5-2.5 CREAM TOPICAL
Qty: 1 | Refills: 2 | Status: ACTIVE | COMMUNITY
Start: 2022-04-27 | End: 1900-01-01

## 2022-04-27 RX ORDER — PROCHLORPERAZINE MALEATE 10 MG/1
10 TABLET ORAL EVERY 6 HOURS
Qty: 30 | Refills: 3 | Status: ACTIVE | COMMUNITY
Start: 2022-04-27 | End: 1900-01-01

## 2022-04-27 RX ORDER — DOXORUBICIN HYDROCHLORIDE 2 MG/ML
112 INJECTION, SOLUTION INTRAVENOUS ONCE
Refills: 0 | Status: COMPLETED | OUTPATIENT
Start: 2022-04-27 | End: 2022-04-27

## 2022-04-27 RX ADMIN — PEGFILGRASTIM-CBQV 6 MILLIGRAM(S): 6 INJECTION, SOLUTION SUBCUTANEOUS at 17:12

## 2022-04-27 RX ADMIN — Medication 306 MILLIGRAM(S): at 16:19

## 2022-04-27 RX ADMIN — FOSAPREPITANT DIMEGLUMINE 300 MILLIGRAM(S): 150 INJECTION, POWDER, LYOPHILIZED, FOR SOLUTION INTRAVENOUS at 15:39

## 2022-04-27 RX ADMIN — Medication 122 MILLIGRAM(S): at 15:38

## 2022-04-27 RX ADMIN — DOXORUBICIN HYDROCHLORIDE 672 MILLIGRAM(S): 2 INJECTION, SOLUTION INTRAVENOUS at 16:20

## 2022-04-28 ENCOUNTER — NON-APPOINTMENT (OUTPATIENT)
Age: 52
End: 2022-04-28

## 2022-05-01 NOTE — REASON FOR VISIT
[Follow-Up Visit] : a follow-up [Family Member] : family member [FreeTextEntry2] : left breast cancer

## 2022-05-01 NOTE — HISTORY OF PRESENT ILLNESS
[de-identified] : 51 y/o premenopausal female with PMH of HTN, hypothyroid, DLD, GERD was referred by Dr Villavicencio for consultation of newly diagnosed left breast invasive ductal carcinoma. \par \par Patient noticed Lt breast fullness and pain a couple of months ago that was coinciding with her periods so she did not think too much of it. But about a month ago she noticed the Lt breast pain was sharp and severe with pain/weakness of her L arm. She then noticed L nipple inversion and a palpable mass in the superior L breast. She denies any nipple discharge and denies any right sided breast complaints. Her previous screening mammo in Dec 2020 did not show any abnormality in the Lt breast. \par \par On 3/24/22 she underwent b/l mammogram and US: \par Right breast: There is a benign cyst at the 9:00 position 5 cm from the nipple measuring 0.9 cm.\par Left breast:  - There are 2 morphologically abnormal lymph nodes in the axilla, at the 1:00 position 13 cm from the nipple measuring 1.0 x 0.7 x 0.7 cm and 0.8 x 0.7 x 0.5 cm. \par -12:00 position 3 cm from the nipple, there is a irregular hypoechoic mass measuring 4.6 x 1.7 x 6.7 cm.\par -3:00 position 4 cm from the nipple, there are 2 indeterminate masses measuring 0.3 x 0.3 x 0.2 cm and 0.3 x 0.4 x 0.3 cm. \par -2:00 position 3 cm from the nipple, additional similar appearing mass is identified measuring 0.4 x 0.3 x 0.3 cm.\par -8:00 position 5 cm from the nipple, there is an irregular hypoechoic mass measuring 0.6 x 0.5 x 0.5 cm. \par BI-RADS Category 4: Suspicious\par \par On 3/29/22, she underwent US guided core needle biopsies:\par - Left, 12:00 N3, 6.7cm: (top-hat) Well to moderately differentiated IDC with focal micropapillary features and associated microca++., DCIS, solid and cribriform types with comedo necrosis, intermediate nuclear grade.Foci of perineural invasion are seen.ER (+) %, SC (+) %, HER2 (-) 0, Ki-67 5-7%\par \par - Left, 8:00 N5, 0.6cm: (stop-light)- Well to moderately differentiated IDC with focal micropapillary features and associated microca++.- DCIS, solid and cribriform types,intermediate nuclear grade.ER (+) % SC (+) % HER2 (-) 1+ Ki-67 3-5% \par \par - Left, axillary mass: (twirl) - Lymph node fragments with metastatic carcinoma, largest contiguous focus of which measures 4.0 mm.- Foci of microscopic extracapsular extension are present in this biopsy material.\par \par She also underwent CT CAP that did not show any metastatic disease. 2.2 cm right adnexal hypodensity was seen and may represent a cyst. Follow up pelvic US was recommended.\par \par She saw Dr Villavicencio for surgical consultation and was recommended to have Neoadjuvant chemotherapy and followed by surgery.  PET/Ct and MRI b/l breast were ordered and are pending for insurance authorization. Patient feels overwhelmed with her diagnosis but otherwise has no weight loss, HONG, new bone/back pain, abdominal pain. She has c/o headaches which happens to her from her prior sinus problems. No prior cardiac problems. \par \par She is , premenopause, LMP in 2022. She has no family h/o breast or ovarian cancer. Her mother had lung cancer. She works as a  in a restaurant. \par \par \par  [de-identified] : 4/27/2022\lis Dickens is here to follow up and chemotherapy. Her  is with her. She was recently diagnosed with invasive well to moderately differentiated ductal carcinoma of the left breast, ER/FL positive and Her-2 negative, s/p biopsy. Clinical stage: [id number]. Her staging CT c/a/p did not show evidence of distant mets. She had her port placed by IR on 4/22/22. She had echo done with Dr Griffith on Monday. Her LVEF is normal 55-60%. She is scheduled for Bone scan on Friday. She feels well, gets intermittent left breast/axilla pain.

## 2022-05-01 NOTE — ASSESSMENT
[FreeTextEntry1] : 51 y/o pre menopausal female with new diagnosis of Lt breast invasive well to moderately differentiated ductal carcinoma, ER/IL positive and Her-2 negative, s/p biopsy.\par Clinical stage: [id number].\par \par Assessment and Plan:\par -- Locally advanced IDC, Clinical stage: [id number]. CT CAP did not show evidence of metastatic disease.  \par -- Discussed neoadjuvant chemotherapy. She will start dose dense  Adriamycin and Cytoxan given every 2 weeks for 4 cycles followed by Taxol weekly x 12. Neulasta injection will be given after dose-dense AC to prevent neutropenia. \par -- We reviewed side effects and toxicity of chemotherapy. Adriamycin is associated with cardiac toxicity, may cause cardiomyopathy and decreased heart function. It is also associated with small risk of developing leukemia. Chemotherapy can cause bone marrow suppression, cytopenia, increased risk of infection, nausea, vomiting, diarrhea, fatigue, alopecia, infusional reaction, peripheral neuropathy, risk of infusion reaction. Patient agreed to proceed with chemotherapy, consent obtained and will start today. \par -- Blood work on 4/19/22 ([facility]) reviewed and is adequate.\par -- 2D Echo done with Dr Griffith on Monday. Her LVEF is normal 55-60%.\par -- Will do BMP, LFT, CA 15-3 and CEA today.\par -- Advised to re-schedule Bone scan on her second week post treatment as Neulasta may affect her results.\par -- In light of estrogen receptor positive breast cancer, she will be benefit with adjuvant endocrine therapy. The choice of adjuvant endocrine therapy in premenopausal female include Tamoxifen, Tamoxifen with OFS or an aromatase inhibitor plus OFS. Given large tumor and positive lymph node, AI with OFS is recommended. OFS can be achieved by Zoladex injection monthly or prophylactic BSO. We will discuss these options in more detail before she starts treatment. \par \par All her concerns were addressed during the visit.\par \par RTO for followup in 2 weeks. She will call if there is nay new concern.\par \par Case was seen and discussed with Dr. Helms who agreed with assessment and plan.

## 2022-05-01 NOTE — CONSULT LETTER
[Dear  ___] : Dear  [unfilled], [Please see my note below.] : Please see my note below. [Sincerely,] : Sincerely, [Courtesy Letter:] : I had the pleasure of seeing your patient, [unfilled], in my office today. [FreeTextEntry3] : Phillip Helms MD

## 2022-05-01 NOTE — PHYSICAL EXAM
[Fully active, able to carry on all pre-disease performance without restriction] : Status 0 - Fully active, able to carry on all pre-disease performance without restriction [Normal] : affect appropriate [de-identified] : Lt breast: About 6 cm palpable mass noted in the 12'o clock position with no overlying skin changes. Another palpable about 1 cm mass noted at 8'o clock position. Nipple is inverted. Axillary adenopathy is not palpable. Rt Breast: No palpable mass or axillary adenopathy.  [de-identified] : right port-A-cath intact, no erythema noted

## 2022-05-06 ENCOUNTER — RESULT REVIEW (OUTPATIENT)
Age: 52
End: 2022-05-06

## 2022-05-06 ENCOUNTER — OUTPATIENT (OUTPATIENT)
Dept: OUTPATIENT SERVICES | Facility: HOSPITAL | Age: 52
LOS: 1 days | Discharge: HOME | End: 2022-05-06
Payer: COMMERCIAL

## 2022-05-06 DIAGNOSIS — C50.812 MALIGNANT NEOPLASM OF OVERLAPPING SITES OF LEFT FEMALE BREAST: ICD-10-CM

## 2022-05-06 DIAGNOSIS — Z98.890 OTHER SPECIFIED POSTPROCEDURAL STATES: Chronic | ICD-10-CM

## 2022-05-06 DIAGNOSIS — Z90.49 ACQUIRED ABSENCE OF OTHER SPECIFIED PARTS OF DIGESTIVE TRACT: Chronic | ICD-10-CM

## 2022-05-06 PROCEDURE — 78803 RP LOCLZJ TUM SPECT 1 AREA: CPT | Mod: 26

## 2022-05-06 PROCEDURE — 78306 BONE IMAGING WHOLE BODY: CPT | Mod: 26

## 2022-05-11 ENCOUNTER — LABORATORY RESULT (OUTPATIENT)
Age: 52
End: 2022-05-11

## 2022-05-11 ENCOUNTER — APPOINTMENT (OUTPATIENT)
Dept: INFUSION THERAPY | Facility: CLINIC | Age: 52
End: 2022-05-11
Payer: COMMERCIAL

## 2022-05-11 ENCOUNTER — APPOINTMENT (OUTPATIENT)
Dept: HEMATOLOGY ONCOLOGY | Facility: CLINIC | Age: 52
End: 2022-05-11
Payer: COMMERCIAL

## 2022-05-11 VITALS
HEIGHT: 66 IN | WEIGHT: 168 LBS | BODY MASS INDEX: 27 KG/M2 | SYSTOLIC BLOOD PRESSURE: 134 MMHG | RESPIRATION RATE: 14 BRPM | DIASTOLIC BLOOD PRESSURE: 100 MMHG | TEMPERATURE: 97.7 F | HEART RATE: 75 BPM | OXYGEN SATURATION: 98 %

## 2022-05-11 DIAGNOSIS — Z00.00 ENCOUNTER FOR GENERAL ADULT MEDICAL EXAMINATION W/OUT ABNORMAL FINDINGS: ICD-10-CM

## 2022-05-11 LAB
ALBUMIN SERPL ELPH-MCNC: 4.2 G/DL
ALP BLD-CCNC: 78 U/L
ALT SERPL-CCNC: 9 U/L
ANION GAP SERPL CALC-SCNC: 11 MMOL/L
AST SERPL-CCNC: 13 U/L
BILIRUB DIRECT SERPL-MCNC: <0.2 MG/DL
BILIRUB INDIRECT SERPL-MCNC: NORMAL MG/DL
BILIRUB SERPL-MCNC: <0.2 MG/DL
BUN SERPL-MCNC: 13 MG/DL
CALCIUM SERPL-MCNC: 9 MG/DL
CHLORIDE SERPL-SCNC: 104 MMOL/L
CO2 SERPL-SCNC: 28 MMOL/L
CREAT SERPL-MCNC: 0.8 MG/DL
EGFR: 89 ML/MIN/1.73M2
GLUCOSE SERPL-MCNC: 80 MG/DL
HCT VFR BLD CALC: 39 %
HGB BLD-MCNC: 13.2 G/DL
MCHC RBC-ENTMCNC: 32.6 PG
MCHC RBC-ENTMCNC: 33.8 G/DL
MCV RBC AUTO: 96.3 FL
PLATELET # BLD AUTO: 295 K/UL
PMV BLD: 8.6 FL
POTASSIUM SERPL-SCNC: 4.3 MMOL/L
PROT SERPL-MCNC: 6.5 G/DL
RBC # BLD: 4.05 M/UL
RBC # FLD: 13 %
SODIUM SERPL-SCNC: 143 MMOL/L
WBC # FLD AUTO: 19.08 K/UL

## 2022-05-11 PROCEDURE — 99215 OFFICE O/P EST HI 40 MIN: CPT

## 2022-05-11 RX ORDER — PEGFILGRASTIM-CBQV 6 MG/.6ML
6 INJECTION, SOLUTION SUBCUTANEOUS ONCE
Refills: 0 | Status: COMPLETED | OUTPATIENT
Start: 2022-05-11 | End: 2022-05-11

## 2022-05-11 RX ORDER — DOXORUBICIN HYDROCHLORIDE 2 MG/ML
112 INJECTION, SOLUTION INTRAVENOUS ONCE
Refills: 0 | Status: COMPLETED | OUTPATIENT
Start: 2022-05-11 | End: 2022-05-11

## 2022-05-11 RX ORDER — FOSAPREPITANT DIMEGLUMINE 150 MG/5ML
150 INJECTION, POWDER, LYOPHILIZED, FOR SOLUTION INTRAVENOUS ONCE
Refills: 0 | Status: COMPLETED | OUTPATIENT
Start: 2022-05-11 | End: 2022-05-11

## 2022-05-11 RX ORDER — CYCLOPHOSPHAMIDE 100 MG
1120 VIAL (EA) INTRAVENOUS ONCE
Refills: 0 | Status: COMPLETED | OUTPATIENT
Start: 2022-05-11 | End: 2022-05-11

## 2022-05-11 RX ORDER — DEXAMETHASONE 0.5 MG/5ML
12 ELIXIR ORAL ONCE
Refills: 0 | Status: COMPLETED | OUTPATIENT
Start: 2022-05-11 | End: 2022-05-11

## 2022-05-11 RX ADMIN — FOSAPREPITANT DIMEGLUMINE 300 MILLIGRAM(S): 150 INJECTION, POWDER, LYOPHILIZED, FOR SOLUTION INTRAVENOUS at 13:40

## 2022-05-11 RX ADMIN — Medication 306 MILLIGRAM(S): at 14:30

## 2022-05-11 RX ADMIN — Medication 122 MILLIGRAM(S): at 13:18

## 2022-05-11 RX ADMIN — PEGFILGRASTIM-CBQV 6 MILLIGRAM(S): 6 INJECTION, SOLUTION SUBCUTANEOUS at 15:44

## 2022-05-11 RX ADMIN — Medication 12 MILLIGRAM(S): at 13:40

## 2022-05-11 RX ADMIN — FOSAPREPITANT DIMEGLUMINE 150 MILLIGRAM(S): 150 INJECTION, POWDER, LYOPHILIZED, FOR SOLUTION INTRAVENOUS at 14:00

## 2022-05-11 RX ADMIN — Medication 1120 MILLIGRAM(S): at 15:30

## 2022-05-11 RX ADMIN — DOXORUBICIN HYDROCHLORIDE 672 MILLIGRAM(S): 2 INJECTION, SOLUTION INTRAVENOUS at 14:11

## 2022-05-15 NOTE — ASSESSMENT
[FreeTextEntry1] : 53 y/o pre menopausal female with new diagnosis of Lt breast invasive well to moderately differentiated ductal carcinoma, ER/[place] positive and Her-2 negative, s/p biopsy.\par Clinical stage: [id number].\par \par Assessment and Plan:\par -- Locally advanced IDC, Clinical stage: [id number]. CT CAP did not show evidence of metastatic disease.  \par -- Proceed with 2nd cycle does dense AC.  \par -- CBC shows adequate blood counts. \par -- Will give Neulasta after chemo to prevent neutropenia. \par -- Order blood work: BMP, LFT, tumor markers. \par -- 2D Echo done with Dr Edda Masters . Her LVEF is normal 55-60%.\par -- After completion of 4 cycles AC she will receive 12 weekly doses of Taxol. \par -- In light of estrogen receptor positive breast cancer, she will be benefit with adjuvant endocrine therapy. The choice of adjuvant endocrine therapy in premenopausal female include Tamoxifen, Tamoxifen with OFS or an aromatase inhibitor plus OFS. Given large tumor and positive lymph node, AI with OFS is recommended. OFS can be achieved by Zoladex injection monthly or prophylactic BSO. We will discuss these options in more detail before she starts treatment. \par -- RTO for followup in 2 weeks. She will call if there is any new concern.\par \par Case was seen and discussed with Dr. Helms who agreed with assessment and plan.

## 2022-05-15 NOTE — CONSULT LETTER
[Dear  ___] : Dear  [unfilled], [Courtesy Letter:] : I had the pleasure of seeing your patient, [unfilled], in my office today. [Please see my note below.] : Please see my note below. [Sincerely,] : Sincerely, [FreeTextEntry3] : Phillip Helms MD

## 2022-05-15 NOTE — HISTORY OF PRESENT ILLNESS
[de-identified] : 51 y/o premenopausal female with PMH of HTN, hypothyroid, DLD, GERD was referred by Dr Villavicencio for consultation of newly diagnosed left breast invasive ductal carcinoma. \par \par Patient noticed Lt breast fullness and pain a couple of months ago that was coinciding with her periods so she did not think too much of it. But about a month ago she noticed the Lt breast pain was sharp and severe with pain/weakness of her L arm. She then noticed L nipple inversion and a palpable mass in the superior L breast. She denies any nipple discharge and denies any right sided breast complaints. Her previous screening mammo in Dec 2020 did not show any abnormality in the Lt breast. \par \par On 3/24/22 she underwent b/l mammogram and US: \par Right breast: There is a benign cyst at the 9:00 position 5 cm from the nipple measuring 0.9 cm.\par Left breast:  - There are 2 morphologically abnormal lymph nodes in the axilla, at the 1:00 position 13 cm from the nipple measuring 1.0 x 0.7 x 0.7 cm and 0.8 x 0.7 x 0.5 cm. \par -12:00 position 3 cm from the nipple, there is a irregular hypoechoic mass measuring 4.6 x 1.7 x 6.7 cm.\par -3:00 position 4 cm from the nipple, there are 2 indeterminate masses measuring 0.3 x 0.3 x 0.2 cm and 0.3 x 0.4 x 0.3 cm. \par -2:00 position 3 cm from the nipple, additional similar appearing mass is identified measuring 0.4 x 0.3 x 0.3 cm.\par -8:00 position 5 cm from the nipple, there is an irregular hypoechoic mass measuring 0.6 x 0.5 x 0.5 cm. \par BI-RADS Category 4: Suspicious\par \par On 3/29/22, she underwent US guided core needle biopsies:\par - Left, 12:00 N3, 6.7cm: (top-hat) Well to moderately differentiated IDC with focal micropapillary features and associated microca++., DCIS, solid and cribriform types with comedo necrosis, intermediate nuclear grade.Foci of perineural invasion are seen.ER (+) %, VT (+) %, HER2 (-) 0, Ki-67 5-7%\par \par - Left, 8:00 N5, 0.6cm: (stop-light)- Well to moderately differentiated IDC with focal micropapillary features and associated microca++.- DCIS, solid and cribriform types,intermediate nuclear grade.ER (+) % VT (+) % HER2 (-) 1+ Ki-67 3-5% \par \par - Left, axillary mass: (twirl) - Lymph node fragments with metastatic carcinoma, largest contiguous focus of which measures 4.0 mm.- Foci of microscopic extracapsular extension are present in this biopsy material.\par \par She also underwent CT CAP that did not show any metastatic disease. 2.2 cm right adnexal hypodensity was seen and may represent a cyst. Follow up pelvic US was recommended.\par \par She saw Dr Villavicencio for surgical consultation and was recommended to have Neoadjuvant chemotherapy and followed by surgery.  PET/Ct and MRI b/l breast were ordered and are pending for insurance authorization. Patient feels overwhelmed with her diagnosis but otherwise has no weight loss, HONG, new bone/back pain, abdominal pain. She has c/o headaches which happens to her from her prior sinus problems. No prior cardiac problems. \par \par She is , premenopause, LMP in 2022. She has no family h/o breast or ovarian cancer. Her mother had lung cancer. She works as a  in a restaurant. \par \par \par  [de-identified] : 4/27/2022\lis Dickens is here to follow up and chemotherapy. Her  is with her. She was recently diagnosed with invasive well to moderately differentiated ductal carcinoma of the left breast, ER/NM positive and Her-2 negative, s/p biopsy. Clinical stage: [id number]. Her staging CT c/a/p did not show evidence of distant mets. She had her port placed by IR on 4/22/22. She had echo done with Dr Griffith on Monday. Her LVEF is normal 55-60%. She is scheduled for Bone scan on Friday. She feels well, gets intermittent left breast/axilla pain.\par \par 5/11/22- Chrissie is here for follow up visit for chemotherapy.  She was recently diagnosed with invasive well to moderately differentiated ductal carcinoma of the left breast, ER/NM positive and Her-2 negative, s/p biopsy. Clinical stage: [id number]. Her staging CT c/a/p did not show evidence of distant mets. Her Bone scan was negative as well. Her Echo showed 55-60% EF and she had port placed and received 1st dose of AC. She tolerated the treatment well except for some fatigue and some nausea. No fevers, chills, vomiting, diarrhea, SOB, CP. She also tolerated the Neulasta well.

## 2022-05-15 NOTE — PHYSICAL EXAM
[Fully active, able to carry on all pre-disease performance without restriction] : Status 0 - Fully active, able to carry on all pre-disease performance without restriction [Normal] : affect appropriate [de-identified] : Left breast mass has decreased in size.  [de-identified] : right port-A-cath intact, no erythema noted

## 2022-05-23 ENCOUNTER — LABORATORY RESULT (OUTPATIENT)
Age: 52
End: 2022-05-23

## 2022-05-23 ENCOUNTER — APPOINTMENT (OUTPATIENT)
Dept: HEMATOLOGY ONCOLOGY | Facility: CLINIC | Age: 52
End: 2022-05-23
Payer: COMMERCIAL

## 2022-05-23 VITALS
RESPIRATION RATE: 18 BRPM | SYSTOLIC BLOOD PRESSURE: 137 MMHG | HEART RATE: 101 BPM | BODY MASS INDEX: 26.07 KG/M2 | HEIGHT: 68 IN | DIASTOLIC BLOOD PRESSURE: 100 MMHG | TEMPERATURE: 98.6 F | WEIGHT: 172 LBS

## 2022-05-23 DIAGNOSIS — K59.00 CONSTIPATION, UNSPECIFIED: ICD-10-CM

## 2022-05-23 LAB
ALBUMIN SERPL ELPH-MCNC: 4.6 G/DL
ALP BLD-CCNC: 134 U/L
ALT SERPL-CCNC: 16 U/L
ANION GAP SERPL CALC-SCNC: 16 MMOL/L
AST SERPL-CCNC: 16 U/L
BILIRUB DIRECT SERPL-MCNC: <0.2 MG/DL
BILIRUB INDIRECT SERPL-MCNC: NORMAL MG/DL
BILIRUB SERPL-MCNC: <0.2 MG/DL
BUN SERPL-MCNC: 11 MG/DL
CALCIUM SERPL-MCNC: 9.4 MG/DL
CANCER AG15-3 SERPL-ACNC: 32.7 U/ML
CEA SERPL-MCNC: 2.7 NG/ML
CHLORIDE SERPL-SCNC: 99 MMOL/L
CO2 SERPL-SCNC: 23 MMOL/L
CREAT SERPL-MCNC: 0.8 MG/DL
EGFR: 89 ML/MIN/1.73M2
GLUCOSE SERPL-MCNC: 124 MG/DL
HCT VFR BLD CALC: 37.4 %
HGB BLD-MCNC: 12.6 G/DL
MCHC RBC-ENTMCNC: 32.6 PG
MCHC RBC-ENTMCNC: 33.7 G/DL
MCV RBC AUTO: 96.9 FL
PLATELET # BLD AUTO: 338 K/UL
PMV BLD: 9.1 FL
POTASSIUM SERPL-SCNC: 4.2 MMOL/L
PROT SERPL-MCNC: 7.2 G/DL
RBC # BLD: 3.86 M/UL
RBC # FLD: 13.4 %
SODIUM SERPL-SCNC: 138 MMOL/L
WBC # FLD AUTO: 29.27 K/UL

## 2022-05-23 PROCEDURE — 99215 OFFICE O/P EST HI 40 MIN: CPT

## 2022-05-23 RX ORDER — SENNOSIDES 8.6 MG TABLETS 8.6 MG/1
8.6 TABLET ORAL AT BEDTIME
Qty: 1 | Refills: 1 | Status: ACTIVE | COMMUNITY
Start: 2022-05-23 | End: 1900-01-01

## 2022-05-23 NOTE — HISTORY OF PRESENT ILLNESS
[de-identified] : 53 y/o premenopausal female with PMH of HTN, hypothyroid, DLD, GERD was referred by Dr Villavicencio for consultation of newly diagnosed left breast invasive ductal carcinoma. \par \par Patient noticed Lt breast fullness and pain a couple of months ago that was coinciding with her periods so she did not think too much of it. But about a month ago she noticed the Lt breast pain was sharp and severe with pain/weakness of her L arm. She then noticed L nipple inversion and a palpable mass in the superior L breast. She denies any nipple discharge and denies any right sided breast complaints. Her previous screening mammo in Dec 2020 did not show any abnormality in the Lt breast. \par \par On 3/24/22 she underwent b/l mammogram and US: \par Right breast: There is a benign cyst at the 9:00 position 5 cm from the nipple measuring 0.9 cm.\par Left breast:  - There are 2 morphologically abnormal lymph nodes in the axilla, at the 1:00 position 13 cm from the nipple measuring 1.0 x 0.7 x 0.7 cm and 0.8 x 0.7 x 0.5 cm. \par -12:00 position 3 cm from the nipple, there is a irregular hypoechoic mass measuring 4.6 x 1.7 x 6.7 cm.\par -3:00 position 4 cm from the nipple, there are 2 indeterminate masses measuring 0.3 x 0.3 x 0.2 cm and 0.3 x 0.4 x 0.3 cm. \par -2:00 position 3 cm from the nipple, additional similar appearing mass is identified measuring 0.4 x 0.3 x 0.3 cm.\par -8:00 position 5 cm from the nipple, there is an irregular hypoechoic mass measuring 0.6 x 0.5 x 0.5 cm. \par BI-RADS Category 4: Suspicious\par \par On 3/29/22, she underwent US guided core needle biopsies:\par - Left, 12:00 N3, 6.7cm: (top-hat) Well to moderately differentiated IDC with focal micropapillary features and associated microca++., DCIS, solid and cribriform types with comedo necrosis, intermediate nuclear grade.Foci of perineural invasion are seen.ER (+) %, PA (+) %, HER2 (-) 0, Ki-67 5-7%\par \par - Left, 8:00 N5, 0.6cm: (stop-light)- Well to moderately differentiated IDC with focal micropapillary features and associated microca++.- DCIS, solid and cribriform types,intermediate nuclear grade.ER (+) % PA (+) % HER2 (-) 1+ Ki-67 3-5% \par \par - Left, axillary mass: (twirl) - Lymph node fragments with metastatic carcinoma, largest contiguous focus of which measures 4.0 mm.- Foci of microscopic extracapsular extension are present in this biopsy material.\par \par She also underwent CT CAP that did not show any metastatic disease. 2.2 cm right adnexal hypodensity was seen and may represent a cyst. Follow up pelvic US was recommended.\par \par She saw Dr Villavicencio for surgical consultation and was recommended to have Neoadjuvant chemotherapy and followed by surgery.  PET/Ct and MRI b/l breast were ordered and are pending for insurance authorization. Patient feels overwhelmed with her diagnosis but otherwise has no weight loss, HONG, new bone/back pain, abdominal pain. She has c/o headaches which happens to her from her prior sinus problems. No prior cardiac problems. \par \par She is , premenopause, LMP in 2022. She has no family h/o breast or ovarian cancer. Her mother had lung cancer. She works as a  in a restaurant. \par \par \par  [de-identified] : 4/27/2022\lis Dickens is here to follow up and chemotherapy. Her  is with her. She was recently diagnosed with invasive well to moderately differentiated ductal carcinoma of the left breast, ER/HI positive and Her-2 negative, s/p biopsy. Clinical stage: [id number]. Her staging CT c/a/p did not show evidence of distant mets. She had her port placed by IR on 4/22/22. She had echo done with Dr Griffith on Monday. Her LVEF is normal 55-60%. She is scheduled for Bone scan on Friday. She feels well, gets intermittent left breast/axilla pain.\par \par 5/11/22- Chrissie is here for follow up visit for chemotherapy.  She was recently diagnosed with invasive well to moderately differentiated ductal carcinoma of the left breast, ER/HI positive and Her-2 negative, s/p biopsy. Clinical stage: [id number]. Her staging CT c/a/p did not show evidence of distant mets. Her Bone scan was negative as well. Her Echo showed 55-60% EF and she had port placed and received 1st dose of AC. She tolerated the treatment well except for some fatigue and some nausea. No fevers, chills, vomiting, diarrhea, SOB, CP. She also tolerated the Neulasta well. \par \par 5/23/22\lis Dickens is here for follow up visit for chemotherapy.  She was recently diagnosed with invasive well to moderately differentiated ductal carcinoma of the left breast, ER/HI positive and Her-2 negative, s/p biopsy. Clinical stage: [id number]. Her staging CT c/a/p did not show evidence of distant mets. Her Bone scan was negative as well. Her Echo showed 55-60% EF. She started on neoadjuvant chemo with dose dense AC. To date, she has received 2 cycles. She is tolerating treatment but does endorse constipation that causes her to feel nauseous. She is trying Colace with minimal relief. She is unable to tolerate MiraLAX as it causes her more nausea.

## 2022-05-23 NOTE — ASSESSMENT
[FreeTextEntry1] : 53 y/o pre menopausal female with new diagnosis of Lt breast invasive well to moderately differentiated ductal carcinoma, ER/[place] positive and Her-2 negative, s/p biopsy.\par Clinical stage: [id number].\par \par Assessment and Plan:\par -- Locally advanced IDC, Clinical stage: [id number]. CT CAP did not show evidence of metastatic disease.  \par -- Proceed with 3rd cycle dose dense AC.  \par -- CBC shows adequate blood counts. \par -- Will give Neulasta after chemo to prevent neutropenia. \par -- Order blood work: BMP, LFT, tumor markers. \par -- Senna and Dulcolax ordered for constipation. She was instructed to take them 2-3 days prior to her scheduled day of chemotherapy \par -- 2D Echo done with Dr Edda Masters . Her LVEF is normal 55-60%.\par -- After completion of 4 cycles AC she will receive 12 weekly doses of Taxol. \par -- In light of estrogen receptor positive breast cancer, she will be benefit with adjuvant endocrine therapy. The choice of adjuvant endocrine therapy in premenopausal female include Tamoxifen, Tamoxifen with OFS or an aromatase inhibitor plus OFS. Given large tumor and positive lymph node, AI with OFS is recommended. OFS can be achieved by Zoladex injection monthly or prophylactic BSO. We will discuss these options in more detail before she starts treatment. \par -- RTO for followup in 2 weeks. She will call if there is any new concern.\par \par Case was seen and discussed with Dr. Helms who agreed with assessment and plan.

## 2022-05-23 NOTE — PHYSICAL EXAM
[Fully active, able to carry on all pre-disease performance without restriction] : Status 0 - Fully active, able to carry on all pre-disease performance without restriction [Normal] : affect appropriate [de-identified] : Left breast mass has decreased in size.  [de-identified] : right port-A-cath intact, no erythema noted

## 2022-05-25 ENCOUNTER — APPOINTMENT (OUTPATIENT)
Dept: INFUSION THERAPY | Facility: CLINIC | Age: 52
End: 2022-05-25

## 2022-05-25 RX ORDER — DOXORUBICIN HYDROCHLORIDE 2 MG/ML
112 INJECTION, SOLUTION INTRAVENOUS ONCE
Refills: 0 | Status: COMPLETED | OUTPATIENT
Start: 2022-05-25 | End: 2022-05-25

## 2022-05-25 RX ORDER — FOSAPREPITANT DIMEGLUMINE 150 MG/5ML
150 INJECTION, POWDER, LYOPHILIZED, FOR SOLUTION INTRAVENOUS ONCE
Refills: 0 | Status: COMPLETED | OUTPATIENT
Start: 2022-05-25 | End: 2022-05-25

## 2022-05-25 RX ORDER — CYCLOPHOSPHAMIDE 100 MG
1120 VIAL (EA) INTRAVENOUS ONCE
Refills: 0 | Status: COMPLETED | OUTPATIENT
Start: 2022-05-25 | End: 2022-05-25

## 2022-05-25 RX ORDER — DEXAMETHASONE 0.5 MG/5ML
12 ELIXIR ORAL ONCE
Refills: 0 | Status: COMPLETED | OUTPATIENT
Start: 2022-05-25 | End: 2022-05-25

## 2022-05-25 RX ORDER — PEGFILGRASTIM-CBQV 6 MG/.6ML
6 INJECTION, SOLUTION SUBCUTANEOUS ONCE
Refills: 0 | Status: COMPLETED | OUTPATIENT
Start: 2022-05-25 | End: 2022-05-25

## 2022-05-25 RX ADMIN — Medication 306 MILLIGRAM(S): at 13:32

## 2022-05-25 RX ADMIN — FOSAPREPITANT DIMEGLUMINE 300 MILLIGRAM(S): 150 INJECTION, POWDER, LYOPHILIZED, FOR SOLUTION INTRAVENOUS at 12:38

## 2022-05-25 RX ADMIN — Medication 122 MILLIGRAM(S): at 12:38

## 2022-05-25 RX ADMIN — DOXORUBICIN HYDROCHLORIDE 672 MILLIGRAM(S): 2 INJECTION, SOLUTION INTRAVENOUS at 13:32

## 2022-05-25 RX ADMIN — PEGFILGRASTIM-CBQV 6 MILLIGRAM(S): 6 INJECTION, SOLUTION SUBCUTANEOUS at 12:37

## 2022-05-26 ENCOUNTER — NON-APPOINTMENT (OUTPATIENT)
Age: 52
End: 2022-05-26

## 2022-05-31 ENCOUNTER — NON-APPOINTMENT (OUTPATIENT)
Age: 52
End: 2022-05-31

## 2022-06-08 ENCOUNTER — APPOINTMENT (OUTPATIENT)
Dept: INTERVENTIONAL RADIOLOGY/VASCULAR | Facility: CLINIC | Age: 52
End: 2022-06-08

## 2022-06-08 ENCOUNTER — LABORATORY RESULT (OUTPATIENT)
Age: 52
End: 2022-06-08

## 2022-06-08 ENCOUNTER — APPOINTMENT (OUTPATIENT)
Dept: HEMATOLOGY ONCOLOGY | Facility: CLINIC | Age: 52
End: 2022-06-08
Payer: COMMERCIAL

## 2022-06-08 ENCOUNTER — APPOINTMENT (OUTPATIENT)
Dept: INFUSION THERAPY | Facility: CLINIC | Age: 52
End: 2022-06-08
Payer: COMMERCIAL

## 2022-06-08 VITALS
OXYGEN SATURATION: 98 % | BODY MASS INDEX: 25.76 KG/M2 | SYSTOLIC BLOOD PRESSURE: 165 MMHG | RESPIRATION RATE: 18 BRPM | DIASTOLIC BLOOD PRESSURE: 74 MMHG | TEMPERATURE: 97.5 F | HEIGHT: 68 IN | HEART RATE: 71 BPM | WEIGHT: 170 LBS

## 2022-06-08 VITALS — HEIGHT: 66 IN | BODY MASS INDEX: 27.32 KG/M2 | WEIGHT: 170 LBS

## 2022-06-08 DIAGNOSIS — Z45.2 ENCOUNTER FOR ADJUSTMENT AND MANAGEMENT OF VASCULAR ACCESS DEVICE: ICD-10-CM

## 2022-06-08 DIAGNOSIS — Z95.828 PRESENCE OF OTHER VASCULAR IMPLANTS AND GRAFTS: ICD-10-CM

## 2022-06-08 PROCEDURE — XXXXX: CPT | Mod: 1L

## 2022-06-08 PROCEDURE — 99215 OFFICE O/P EST HI 40 MIN: CPT

## 2022-06-08 RX ORDER — ONDANSETRON 8 MG/1
8 TABLET ORAL
Qty: 30 | Refills: 2 | Status: ACTIVE | COMMUNITY
Start: 2022-06-08 | End: 1900-01-01

## 2022-06-08 RX ORDER — FOSAPREPITANT DIMEGLUMINE 150 MG/5ML
150 INJECTION, POWDER, LYOPHILIZED, FOR SOLUTION INTRAVENOUS ONCE
Refills: 0 | Status: COMPLETED | OUTPATIENT
Start: 2022-06-08 | End: 2022-06-08

## 2022-06-08 RX ORDER — DOXORUBICIN HYDROCHLORIDE 2 MG/ML
112 INJECTION, SOLUTION INTRAVENOUS ONCE
Refills: 0 | Status: COMPLETED | OUTPATIENT
Start: 2022-06-08 | End: 2022-06-08

## 2022-06-08 RX ORDER — CYCLOPHOSPHAMIDE 100 MG
1120 VIAL (EA) INTRAVENOUS ONCE
Refills: 0 | Status: COMPLETED | OUTPATIENT
Start: 2022-06-08 | End: 2022-06-08

## 2022-06-08 RX ORDER — DEXAMETHASONE 0.5 MG/5ML
12 ELIXIR ORAL ONCE
Refills: 0 | Status: COMPLETED | OUTPATIENT
Start: 2022-06-08 | End: 2022-06-08

## 2022-06-08 RX ORDER — PEGFILGRASTIM-CBQV 6 MG/.6ML
6 INJECTION, SOLUTION SUBCUTANEOUS ONCE
Refills: 0 | Status: COMPLETED | OUTPATIENT
Start: 2022-06-08 | End: 2022-06-08

## 2022-06-08 RX ADMIN — Medication 122 MILLIGRAM(S): at 12:24

## 2022-06-08 RX ADMIN — PEGFILGRASTIM-CBQV 6 MILLIGRAM(S): 6 INJECTION, SOLUTION SUBCUTANEOUS at 12:25

## 2022-06-08 RX ADMIN — Medication 306 MILLIGRAM(S): at 13:14

## 2022-06-08 RX ADMIN — FOSAPREPITANT DIMEGLUMINE 300 MILLIGRAM(S): 150 INJECTION, POWDER, LYOPHILIZED, FOR SOLUTION INTRAVENOUS at 12:24

## 2022-06-08 RX ADMIN — DOXORUBICIN HYDROCHLORIDE 672 MILLIGRAM(S): 2 INJECTION, SOLUTION INTRAVENOUS at 13:14

## 2022-06-08 NOTE — PHYSICAL EXAM
[Fully active, able to carry on all pre-disease performance without restriction] : Status 0 - Fully active, able to carry on all pre-disease performance without restriction [Normal] : affect appropriate [de-identified] : Left breast mass has decreased in size.  [de-identified] : right port-A-cath intact, no erythema noted

## 2022-06-08 NOTE — ASSESSMENT
[FreeTextEntry1] : 51 y/o pre menopausal female with new diagnosis of Lt breast invasive well to moderately differentiated ductal carcinoma, ER/MS positive and Her-2 negative, s/p biopsy.\par Clinical stage: [id number].\par \par Assessment and Plan:\par -- Locally advanced IDC, Clinical stage: [id number]. CT CAP did not show evidence of metastatic disease.  \par -- Proceed with 4th cycle dose dense AC.  \par -- CBC shows adequate blood counts. Leukocytosis is 2/2 Neulasta. \par -- Will still give Neulasta after chemo to prevent neutropenia. \par -- Eye irritation is likely due to Adriamycin. Advised to see a ophthalmologist. \par -- Order blood work: BMP, LFT. \par -- Senna and Dulcolax as needed for constipation.\par -- 2D Echo done with Dr Edda Masters . Her LVEF is normal 55-60%.\par -- Schedule to start weekly Taxol in 2 weeks. \par -- In light of estrogen receptor positive breast cancer, she will be benefit with adjuvant endocrine therapy. The choice of adjuvant endocrine therapy in premenopausal female include Tamoxifen, Tamoxifen with OFS or an aromatase inhibitor plus OFS. Given large tumor and positive lymph node, AI with OFS is recommended. OFS can be achieved by Zoladex injection monthly or prophylactic BSO. We will discuss these options in more detail before she starts treatment. \par -- RTO for followup in 2 weeks. She will call if there is any new concern.\par \par

## 2022-06-08 NOTE — HISTORY OF PRESENT ILLNESS
[de-identified] : 53 y/o premenopausal female with PMH of HTN, hypothyroid, DLD, GERD was referred by Dr Villavicencio for consultation of newly diagnosed left breast invasive ductal carcinoma. \par \par Patient noticed Lt breast fullness and pain a couple of months ago that was coinciding with her periods so she did not think too much of it. But about a month ago she noticed the Lt breast pain was sharp and severe with pain/weakness of her L arm. She then noticed L nipple inversion and a palpable mass in the superior L breast. She denies any nipple discharge and denies any right sided breast complaints. Her previous screening mammo in Dec 2020 did not show any abnormality in the Lt breast. \par \par On 3/24/22 she underwent b/l mammogram and US: \par Right breast: There is a benign cyst at the 9:00 position 5 cm from the nipple measuring 0.9 cm.\par Left breast:  - There are 2 morphologically abnormal lymph nodes in the axilla, at the 1:00 position 13 cm from the nipple measuring 1.0 x 0.7 x 0.7 cm and 0.8 x 0.7 x 0.5 cm. \par -12:00 position 3 cm from the nipple, there is a irregular hypoechoic mass measuring 4.6 x 1.7 x 6.7 cm.\par -3:00 position 4 cm from the nipple, there are 2 indeterminate masses measuring 0.3 x 0.3 x 0.2 cm and 0.3 x 0.4 x 0.3 cm. \par -2:00 position 3 cm from the nipple, additional similar appearing mass is identified measuring 0.4 x 0.3 x 0.3 cm.\par -8:00 position 5 cm from the nipple, there is an irregular hypoechoic mass measuring 0.6 x 0.5 x 0.5 cm. \par BI-RADS Category 4: Suspicious\par \par On 3/29/22, she underwent US guided core needle biopsies:\par - Left, 12:00 N3, 6.7cm: (top-hat) Well to moderately differentiated IDC with focal micropapillary features and associated microca++., DCIS, solid and cribriform types with comedo necrosis, intermediate nuclear grade.Foci of perineural invasion are seen.ER (+) %, VA (+) %, HER2 (-) 0, Ki-67 5-7%\par \par - Left, 8:00 N5, 0.6cm: (stop-light)- Well to moderately differentiated IDC with focal micropapillary features and associated microca++.- DCIS, solid and cribriform types,intermediate nuclear grade.ER (+) % VA (+) % HER2 (-) 1+ Ki-67 3-5% \par \par - Left, axillary mass: (twirl) - Lymph node fragments with metastatic carcinoma, largest contiguous focus of which measures 4.0 mm.- Foci of microscopic extracapsular extension are present in this biopsy material.\par \par She also underwent CT CAP that did not show any metastatic disease. 2.2 cm right adnexal hypodensity was seen and may represent a cyst. Follow up pelvic US was recommended.\par \par She saw Dr Villavicencio for surgical consultation and was recommended to have Neoadjuvant chemotherapy and followed by surgery.  PET/Ct and MRI b/l breast were ordered and are pending for insurance authorization. Patient feels overwhelmed with her diagnosis but otherwise has no weight loss, HONG, new bone/back pain, abdominal pain. She has c/o headaches which happens to her from her prior sinus problems. No prior cardiac problems. \par \par She is , premenopause, LMP in 2022. She has no family h/o breast or ovarian cancer. Her mother had lung cancer. She works as a  in a restaurant. \par \par \par  [de-identified] : 4/27/2022\lis Dickens is here to follow up and chemotherapy. Her  is with her. She was recently diagnosed with invasive well to moderately differentiated ductal carcinoma of the left breast, ER/MO positive and Her-2 negative, s/p biopsy. Clinical stage: [id number]. Her staging CT c/a/p did not show evidence of distant mets. She had her port placed by IR on 4/22/22. She had echo done with Dr Griffith on Monday. Her LVEF is normal 55-60%. She is scheduled for Bone scan on Friday. She feels well, gets intermittent left breast/axilla pain.\par \par 5/11/22- Chrissie is here for follow up visit for chemotherapy.  She was recently diagnosed with invasive well to moderately differentiated ductal carcinoma of the left breast, ER/MO positive and Her-2 negative, s/p biopsy. Clinical stage: [id number]. Her staging CT c/a/p did not show evidence of distant mets. Her Bone scan was negative as well. Her Echo showed 55-60% EF and she had port placed and received 1st dose of AC. She tolerated the treatment well except for some fatigue and some nausea. No fevers, chills, vomiting, diarrhea, SOB, CP. She also tolerated the Neulasta well. \par \par 5/23/22\lis Dickens is here for follow up visit for chemotherapy.  She was recently diagnosed with invasive well to moderately differentiated ductal carcinoma of the left breast, ER/MO positive and Her-2 negative, s/p biopsy. Clinical stage: [id number]. Her staging CT c/a/p did not show evidence of distant mets. Her Bone scan was negative as well. Her Echo showed 55-60% EF. She started on neoadjuvant chemo with dose dense AC. To date, she has received 3 cycles. She complains eye irritation and sensitive in her mouth. Constipation has resolved.

## 2022-06-09 PROBLEM — Z95.828 PORT-A-CATH IN PLACE: Status: RESOLVED | Noted: 2022-06-09 | Resolved: 2022-06-09

## 2022-06-09 PROBLEM — Z45.2 ENCOUNTER FOR CARE RELATED TO PORT-A-CATH: Status: RESOLVED | Noted: 2022-06-09 | Resolved: 2022-06-09

## 2022-06-09 NOTE — ASSESSMENT
[FreeTextEntry1] : 51 y/o female with PMH of HTN, hypothyroid, DLD, GERD and newly diagnosed left breast invasive ductal carcinoma s/p right port a cath placement on 4/22 sent from [facility] for evaluation of port.\par \par Port a cath site cleaned with ChloraPrep, pickups used to successfully remove all three sites of exposed sutures. Erythema began to dissipate post removal. Dermabond placed over suture line to reinforce healing and wound closure. \par \par Will call patient tomorrow to follow up and see if erythema has resolved. If signs of infection develop, will consider short course of antibiotic therapy.

## 2022-06-09 NOTE — HISTORY OF PRESENT ILLNESS
[FreeTextEntry1] : 53 y/o female with PMH of HTN, hypothyroid, DLD, GERD and newly diagnosed left breast invasive ductal carcinoma s/p right port a cath placement on 4/22 sent from [facility] for evaluation of port.\par \par As per patient, port has been healing well but she has noticed pieces of the sutures have been coming through the skin and is now causing her redness and irritation. Patient appears well, denies fevers/chills, or N/V/D. \par \par On examination, port a cath site looks clean, dry and intact. Mild erythema present with three sites of protruding sutures noted. No evidence of swelling, tenderness or drainage on manipulation. No neck pain, no limited ROM.\par  [0] : ~His/Her~ pain was 0 out of 10

## 2022-06-09 NOTE — PHYSICAL EXAM
[Alert] : alert [No Acute Distress] : no acute distress [Well Nourished] : well nourished [Well Developed] : well developed [Normal Sclera/Conjunctiva] : normal sclera/conjunctiva [PERRL] : pupils equal, round and reactive to light [No Respiratory Distress] : no respiratory distress [No Accessory Muscle Use] : no accessory muscle use [No Rash] : no rash [No Skin Lesions] : no skin lesions [Oriented x3] : oriented to person, place, and time [Normal Insight/Judgement] : insight and judgment were intact [Restricted in physically strenuous activity but ambulatory and able to carry out work of a light or sedentary nature] : Restricted in physically strenuous activity but ambulatory and able to carry out work of a light or sedentary nature, e.g., light house work, office work

## 2022-06-09 NOTE — REVIEW OF SYSTEMS
[Fever] : no fever [Chills] : no chills [Feeling Poorly] : not feeling poorly [Feeling Tired] : not feeling tired [Chest Pain] : no chest pain [Palpitations] : no palpitations [Shortness Of Breath] : no shortness of breath [Wheezing] : no wheezing [Cough] : no cough [SOB on Exertion] : no shortness of breath during exertion [Abdominal Pain] : no abdominal pain [Vomiting] : no vomiting [Constipation] : no constipation [Diarrhea] : no diarrhea [FreeTextEntry7] : (negative in relation to port a cath - patient currently on chemotherapy and has experienced some nausea and diarrhea)

## 2022-06-29 ENCOUNTER — APPOINTMENT (OUTPATIENT)
Dept: INFUSION THERAPY | Facility: CLINIC | Age: 52
End: 2022-06-29

## 2022-06-29 ENCOUNTER — LABORATORY RESULT (OUTPATIENT)
Age: 52
End: 2022-06-29

## 2022-06-29 ENCOUNTER — OUTPATIENT (OUTPATIENT)
Dept: OUTPATIENT SERVICES | Facility: HOSPITAL | Age: 52
LOS: 1 days | Discharge: HOME | End: 2022-06-29

## 2022-06-29 ENCOUNTER — APPOINTMENT (OUTPATIENT)
Dept: HEMATOLOGY ONCOLOGY | Facility: CLINIC | Age: 52
End: 2022-06-29

## 2022-06-29 VITALS
WEIGHT: 169 LBS | SYSTOLIC BLOOD PRESSURE: 128 MMHG | DIASTOLIC BLOOD PRESSURE: 96 MMHG | BODY MASS INDEX: 28.16 KG/M2 | TEMPERATURE: 97.8 F | HEART RATE: 80 BPM | HEIGHT: 65 IN

## 2022-06-29 DIAGNOSIS — Z98.890 OTHER SPECIFIED POSTPROCEDURAL STATES: Chronic | ICD-10-CM

## 2022-06-29 DIAGNOSIS — Z90.49 ACQUIRED ABSENCE OF OTHER SPECIFIED PARTS OF DIGESTIVE TRACT: Chronic | ICD-10-CM

## 2022-06-29 DIAGNOSIS — C50.812 MALIGNANT NEOPLASM OF OVERLAPPING SITES OF LEFT FEMALE BREAST: ICD-10-CM

## 2022-06-29 DIAGNOSIS — Z51.11 ENCOUNTER FOR ANTINEOPLASTIC CHEMOTHERAPY: ICD-10-CM

## 2022-06-29 DIAGNOSIS — K59.00 CONSTIPATION, UNSPECIFIED: ICD-10-CM

## 2022-06-29 DIAGNOSIS — Z17.0 MALIGNANT NEOPLASM OF OVERLAPPING SITES OF LEFT FEMALE BREAST: ICD-10-CM

## 2022-06-29 LAB
ALBUMIN SERPL ELPH-MCNC: 4.2 G/DL
ALBUMIN SERPL ELPH-MCNC: 4.5 G/DL
ALBUMIN SERPL ELPH-MCNC: 4.6 G/DL
ALP BLD-CCNC: 112 U/L
ALP BLD-CCNC: 142 U/L
ALP BLD-CCNC: 155 U/L
ALT SERPL-CCNC: 11 U/L
ALT SERPL-CCNC: 13 U/L
ALT SERPL-CCNC: 15 U/L
ANION GAP SERPL CALC-SCNC: 10 MMOL/L
ANION GAP SERPL CALC-SCNC: 15 MMOL/L
ANION GAP SERPL CALC-SCNC: 15 MMOL/L
AST SERPL-CCNC: 15 U/L
AST SERPL-CCNC: 18 U/L
AST SERPL-CCNC: 19 U/L
BILIRUB DIRECT SERPL-MCNC: <0.2 MG/DL
BILIRUB INDIRECT SERPL-MCNC: NORMAL MG/DL
BILIRUB SERPL-MCNC: <0.2 MG/DL
BUN SERPL-MCNC: 10 MG/DL
BUN SERPL-MCNC: 14 MG/DL
BUN SERPL-MCNC: 9 MG/DL
CALCIUM SERPL-MCNC: 8.7 MG/DL
CALCIUM SERPL-MCNC: 9.1 MG/DL
CALCIUM SERPL-MCNC: 9.3 MG/DL
CHLORIDE SERPL-SCNC: 101 MMOL/L
CHLORIDE SERPL-SCNC: 102 MMOL/L
CHLORIDE SERPL-SCNC: 104 MMOL/L
CO2 SERPL-SCNC: 22 MMOL/L
CO2 SERPL-SCNC: 24 MMOL/L
CO2 SERPL-SCNC: 25 MMOL/L
CREAT SERPL-MCNC: 0.7 MG/DL
CREAT SERPL-MCNC: 0.8 MG/DL
CREAT SERPL-MCNC: 0.9 MG/DL
EGFR: 105 ML/MIN/1.73M2
EGFR: 77 ML/MIN/1.73M2
EGFR: 89 ML/MIN/1.73M2
GLUCOSE SERPL-MCNC: 101 MG/DL
GLUCOSE SERPL-MCNC: 108 MG/DL
GLUCOSE SERPL-MCNC: 93 MG/DL
HCT VFR BLD CALC: 32.6 %
HCT VFR BLD CALC: 32.7 %
HGB BLD-MCNC: 10.7 G/DL
HGB BLD-MCNC: 11.4 G/DL
MCHC RBC-ENTMCNC: 32.6 PG
MCHC RBC-ENTMCNC: 32.8 G/DL
MCHC RBC-ENTMCNC: 33.3 PG
MCHC RBC-ENTMCNC: 34.9 G/DL
MCV RBC AUTO: 95.6 FL
MCV RBC AUTO: 99.4 FL
PLATELET # BLD AUTO: 203 K/UL
PLATELET # BLD AUTO: 355 K/UL
PMV BLD: 8.8 FL
PMV BLD: 9.8 FL
POTASSIUM SERPL-SCNC: 4 MMOL/L
POTASSIUM SERPL-SCNC: 4.9 MMOL/L
POTASSIUM SERPL-SCNC: 5 MMOL/L
PROT SERPL-MCNC: 6.2 G/DL
PROT SERPL-MCNC: 6.8 G/DL
PROT SERPL-MCNC: 7.1 G/DL
RBC # BLD: 3.28 M/UL
RBC # BLD: 3.42 M/UL
RBC # FLD: 14.6 %
RBC # FLD: 16.8 %
SODIUM SERPL-SCNC: 136 MMOL/L
SODIUM SERPL-SCNC: 141 MMOL/L
SODIUM SERPL-SCNC: 141 MMOL/L
WBC # FLD AUTO: 23.27 K/UL
WBC # FLD AUTO: 8.97 K/UL

## 2022-06-29 PROCEDURE — 99215 OFFICE O/P EST HI 40 MIN: CPT

## 2022-06-29 RX ORDER — DEXAMETHASONE 0.5 MG/5ML
12 ELIXIR ORAL ONCE
Refills: 0 | Status: COMPLETED | OUTPATIENT
Start: 2022-06-29 | End: 2022-06-29

## 2022-06-29 RX ORDER — PACLITAXEL 6 MG/ML
150 INJECTION, SOLUTION, CONCENTRATE INTRAVENOUS ONCE
Refills: 0 | Status: COMPLETED | OUTPATIENT
Start: 2022-06-29 | End: 2022-06-29

## 2022-06-29 RX ORDER — FAMOTIDINE 10 MG/ML
20 INJECTION INTRAVENOUS ONCE
Refills: 0 | Status: COMPLETED | OUTPATIENT
Start: 2022-06-29 | End: 2022-06-29

## 2022-06-29 RX ORDER — DIPHENHYDRAMINE HCL 50 MG
50 CAPSULE ORAL ONCE
Refills: 0 | Status: COMPLETED | OUTPATIENT
Start: 2022-06-29 | End: 2022-06-29

## 2022-06-29 RX ADMIN — FAMOTIDINE 104 MILLIGRAM(S): 10 INJECTION INTRAVENOUS at 15:00

## 2022-06-29 RX ADMIN — Medication 122 MILLIGRAM(S): at 14:40

## 2022-06-29 RX ADMIN — PACLITAXEL 150 MILLIGRAM(S): 6 INJECTION, SOLUTION, CONCENTRATE INTRAVENOUS at 14:56

## 2022-06-29 RX ADMIN — Medication 102 MILLIGRAM(S): at 15:16

## 2022-07-05 ENCOUNTER — NON-APPOINTMENT (OUTPATIENT)
Age: 52
End: 2022-07-05

## 2022-07-05 PROBLEM — Z51.11 ENCOUNTER FOR ANTINEOPLASTIC CHEMOTHERAPY: Status: ACTIVE | Noted: 2022-05-01

## 2022-07-05 PROBLEM — C50.812 MALIGNANT NEOPLASM OF OVERLAPPING SITES OF LEFT BREAST IN FEMALE, ESTROGEN RECEPTOR POSITIVE: Status: ACTIVE | Noted: 2022-04-12

## 2022-07-05 NOTE — HISTORY OF PRESENT ILLNESS
[de-identified] : 53 y/o premenopausal female with PMH of HTN, hypothyroid, DLD, GERD was referred by Dr Villavicencio for consultation of newly diagnosed left breast invasive ductal carcinoma. \par \par Patient noticed Lt breast fullness and pain a couple of months ago that was coinciding with her periods so she did not think too much of it. But about a month ago she noticed the Lt breast pain was sharp and severe with pain/weakness of her L arm. She then noticed L nipple inversion and a palpable mass in the superior L breast. She denies any nipple discharge and denies any right sided breast complaints. Her previous screening mammo in Dec 2020 did not show any abnormality in the Lt breast. \par \par On 3/24/22 she underwent b/l mammogram and US: \par Right breast: There is a benign cyst at the 9:00 position 5 cm from the nipple measuring 0.9 cm.\par Left breast:  - There are 2 morphologically abnormal lymph nodes in the axilla, at the 1:00 position 13 cm from the nipple measuring 1.0 x 0.7 x 0.7 cm and 0.8 x 0.7 x 0.5 cm. \par -12:00 position 3 cm from the nipple, there is a irregular hypoechoic mass measuring 4.6 x 1.7 x 6.7 cm.\par -3:00 position 4 cm from the nipple, there are 2 indeterminate masses measuring 0.3 x 0.3 x 0.2 cm and 0.3 x 0.4 x 0.3 cm. \par -2:00 position 3 cm from the nipple, additional similar appearing mass is identified measuring 0.4 x 0.3 x 0.3 cm.\par -8:00 position 5 cm from the nipple, there is an irregular hypoechoic mass measuring 0.6 x 0.5 x 0.5 cm. \par BI-RADS Category 4: Suspicious\par \par On 3/29/22, she underwent US guided core needle biopsies:\par - Left, 12:00 N3, 6.7cm: (top-hat) Well to moderately differentiated IDC with focal micropapillary features and associated microca++., DCIS, solid and cribriform types with comedo necrosis, intermediate nuclear grade.Foci of perineural invasion are seen.ER (+) %, [place] (+) %, HER2 (-) 0, Ki-67 5-7%\par \par - Left, 8:00 N5, 0.6cm: (stop-light)- Well to moderately differentiated IDC with focal micropapillary features and associated microca++.- DCIS, solid and cribriform types,intermediate nuclear grade.ER (+) % AZ (+) % HER2 (-) 1+ Ki-67 3-5% \par \par - Left, axillary mass: (twirl) - Lymph node fragments with metastatic carcinoma, largest contiguous focus of which measures 4.0 mm.- Foci of microscopic extracapsular extension are present in this biopsy material.\par \par She also underwent CT CAP that did not show any metastatic disease. 2.2 cm right adnexal hypodensity was seen and may represent a cyst. Follow up pelvic US was recommended.\par \par She saw Dr Villavicencio for surgical consultation and was recommended to have Neoadjuvant chemotherapy and followed by surgery.  PET/Ct and MRI b/l breast were ordered and are pending for insurance authorization. Patient feels overwhelmed with her diagnosis but otherwise has no weight loss, OHNG, new bone/back pain, abdominal pain. She has c/o headaches which happens to her from her prior sinus problems. No prior cardiac problems. \par \par She is , premenopause, LMP in 2022. She has no family h/o breast or ovarian cancer. Her mother had lung cancer. She works as a  in a restaurant. \par \par \par  [de-identified] : 4/27/2022francis Dickens is here to follow up and chemotherapy. Her  is with her. She was recently diagnosed with invasive well to moderately differentiated ductal carcinoma of the left breast, ER/WA positive and Her-2 negative, s/p biopsy. Clinical stage: [id number]. Her staging CT c/a/p did not show evidence of distant mets. She had her port placed by IR on 4/22/22. She had echo done with Dr Griffith on Monday. Her LVEF is normal 55-60%. She is scheduled for Bone scan on Friday. She feels well, gets intermittent left breast/axilla pain.\par \par 5/11/22- Chrissie is here for follow up visit for chemotherapy.  She was recently diagnosed with invasive well to moderately differentiated ductal carcinoma of the left breast, ER/WA positive and Her-2 negative, s/p biopsy. Clinical stage: [id number]. Her staging CT c/a/p did not show evidence of distant mets. Her Bone scan was negative as well. Her Echo showed 55-60% EF and she had port placed and received 1st dose of AC. She tolerated the treatment well except for some fatigue and some nausea. No fevers, chills, vomiting, diarrhea, SOB, CP. She also tolerated the Neulasta well. \par \par 5/23/22francis Dickens is here for follow up visit for chemotherapy.  She was recently diagnosed with invasive well to moderately differentiated ductal carcinoma of the left breast, ER/WA positive and Her-2 negative, s/p biopsy. Clinical stage: [id number]. Her staging CT c/a/p did not show evidence of distant mets. Her Bone scan was negative as well. Her Echo showed 55-60% EF. She started on neoadjuvant chemo with dose dense AC. To date, she has received 3 cycles. She complains eye irritation and sensitive in her mouth. Constipation has resolved.\par \par 6/29/22:francis Dickens is here for follow up visit and chemotherapy.  She was recently diagnosed with invasive well to moderately differentiated ductal carcinoma of the left breast, ER/WA positive and Her-2 negative, s/p biopsy. Clinical stage: [id number]. Her staging CT c/a/p did not show evidence of distant mets. Her Bone scan was negative as well. Her Echo showed 55-60% EF. She has been on neoadjuvant chemo with dose dense AC. She finished 4 cycles of AC and is to start weekly Taxol. Her eye irritation has got better. She feels very upset because her insurance will not allow her to continue her treatment here. \par

## 2022-07-05 NOTE — PHYSICAL EXAM
[Fully active, able to carry on all pre-disease performance without restriction] : Status 0 - Fully active, able to carry on all pre-disease performance without restriction [Normal] : affect appropriate [de-identified] : Left breast mass has decreased in size.  [de-identified] : right port-A-cath intact, no erythema noted

## 2022-07-05 NOTE — ASSESSMENT
[FreeTextEntry1] : 53 y/o pre menopausal female with new diagnosis of Lt breast invasive well to moderately differentiated ductal carcinoma, ER/RI positive and Her-2 negative, s/p biopsy.\par Clinical stage: [id number].\par \par Assessment and Plan:\par -- Locally advanced IDC, Clinical stage: [id number]. CT CAP did not show evidence of metastatic disease.  \par -- Completed dose dense AC x 4 cycles.  \par -- Will start weekly Taxol 80 mg/m2 for total 12 times. We reviewed side effects which may include but not limit to bone marrow suppression, cytopenia, increased risk of infection, nausea, vomiting, diarrhea, fatigue, alopecia, infusional reaction, peripheral neuropathy. She will be given steroid, Benadryl and Pepcid to prevent infusional reaction. \par -- CBC shows adequate blood counts. Mild anemia due to chemo. Will monitor. \par -- Eye irritation is likely due to Adriamycin, improving. Advised to see a ophthalmologist. \par -- Order blood work: BMP, LFT. \par -- Senna and Dulcolax as needed for constipation.\par -- 2D Echo done with Dr Edda Masters . Her LVEF is normal 55-60%.\par -- In light of estrogen receptor positive breast cancer, she will be benefit with adjuvant endocrine therapy. The choice of adjuvant endocrine therapy in premenopausal female include Tamoxifen, Tamoxifen with OFS or an aromatase inhibitor plus OFS. Given large tumor and positive lymph node, AI with OFS is recommended. OFS can be achieved by Zoladex injection monthly or prophylactic BSO. We will discuss these options in more detail before she starts treatment. \par -- RTO for followup in 4 weeks. She will call if there is any new concern.\par \par

## 2022-07-05 NOTE — ASSESSMENT
[FreeTextEntry1] : 53 y/o pre menopausal female with new diagnosis of Lt breast invasive well to moderately differentiated ductal carcinoma, ER/IL positive and Her-2 negative, s/p biopsy.\par Clinical stage: [id number].\par \par Assessment and Plan:\par -- Locally advanced IDC, Clinical stage: [id number]. CT CAP did not show evidence of metastatic disease.  \par -- Completed dose dense AC x 4 cycles.  \par -- Will start weekly Taxol 80 mg/m2 for total 12 times. We reviewed side effects which may include but not limit to bone marrow suppression, cytopenia, increased risk of infection, nausea, vomiting, diarrhea, fatigue, alopecia, infusional reaction, peripheral neuropathy. She will be given steroid, Benadryl and Pepcid to prevent infusional reaction. \par -- CBC shows adequate blood counts. Mild anemia due to chemo. Will monitor. \par -- Eye irritation is likely due to Adriamycin, improving. Advised to see a ophthalmologist. \par -- Order blood work: BMP, LFT. \par -- Senna and Dulcolax as needed for constipation.\par -- 2D Echo done with Dr dEda Masters . Her LVEF is normal 55-60%.\par -- In light of estrogen receptor positive breast cancer, she will be benefit with adjuvant endocrine therapy. The choice of adjuvant endocrine therapy in premenopausal female include Tamoxifen, Tamoxifen with OFS or an aromatase inhibitor plus OFS. Given large tumor and positive lymph node, AI with OFS is recommended. OFS can be achieved by Zoladex injection monthly or prophylactic BSO. We will discuss these options in more detail before she starts treatment. \par -- RTO for followup in 4 weeks. She will call if there is any new concern.\par \par

## 2022-07-05 NOTE — HISTORY OF PRESENT ILLNESS
[de-identified] : 51 y/o premenopausal female with PMH of HTN, hypothyroid, DLD, GERD was referred by Dr Villavicencio for consultation of newly diagnosed left breast invasive ductal carcinoma. \par \par Patient noticed Lt breast fullness and pain a couple of months ago that was coinciding with her periods so she did not think too much of it. But about a month ago she noticed the Lt breast pain was sharp and severe with pain/weakness of her L arm. She then noticed L nipple inversion and a palpable mass in the superior L breast. She denies any nipple discharge and denies any right sided breast complaints. Her previous screening mammo in Dec 2020 did not show any abnormality in the Lt breast. \par \par On 3/24/22 she underwent b/l mammogram and US: \par Right breast: There is a benign cyst at the 9:00 position 5 cm from the nipple measuring 0.9 cm.\par Left breast:  - There are 2 morphologically abnormal lymph nodes in the axilla, at the 1:00 position 13 cm from the nipple measuring 1.0 x 0.7 x 0.7 cm and 0.8 x 0.7 x 0.5 cm. \par -12:00 position 3 cm from the nipple, there is a irregular hypoechoic mass measuring 4.6 x 1.7 x 6.7 cm.\par -3:00 position 4 cm from the nipple, there are 2 indeterminate masses measuring 0.3 x 0.3 x 0.2 cm and 0.3 x 0.4 x 0.3 cm. \par -2:00 position 3 cm from the nipple, additional similar appearing mass is identified measuring 0.4 x 0.3 x 0.3 cm.\par -8:00 position 5 cm from the nipple, there is an irregular hypoechoic mass measuring 0.6 x 0.5 x 0.5 cm. \par BI-RADS Category 4: Suspicious\par \par On 3/29/22, she underwent US guided core needle biopsies:\par - Left, 12:00 N3, 6.7cm: (top-hat) Well to moderately differentiated IDC with focal micropapillary features and associated microca++., DCIS, solid and cribriform types with comedo necrosis, intermediate nuclear grade.Foci of perineural invasion are seen.ER (+) %, AL (+) %, HER2 (-) 0, Ki-67 5-7%\par \par - Left, 8:00 N5, 0.6cm: (stop-light)- Well to moderately differentiated IDC with focal micropapillary features and associated microca++.- DCIS, solid and cribriform types,intermediate nuclear grade.ER (+) % AL (+) % HER2 (-) 1+ Ki-67 3-5% \par \par - Left, axillary mass: (twirl) - Lymph node fragments with metastatic carcinoma, largest contiguous focus of which measures 4.0 mm.- Foci of microscopic extracapsular extension are present in this biopsy material.\par \par She also underwent CT CAP that did not show any metastatic disease. 2.2 cm right adnexal hypodensity was seen and may represent a cyst. Follow up pelvic US was recommended.\par \par She saw Dr Villavicencio for surgical consultation and was recommended to have Neoadjuvant chemotherapy and followed by surgery.  PET/Ct and MRI b/l breast were ordered and are pending for insurance authorization. Patient feels overwhelmed with her diagnosis but otherwise has no weight loss, HONG, new bone/back pain, abdominal pain. She has c/o headaches which happens to her from her prior sinus problems. No prior cardiac problems. \par \par She is , premenopause, LMP in 2022. She has no family h/o breast or ovarian cancer. Her mother had lung cancer. She works as a  in a restaurant. \par \par \par  [de-identified] : 4/27/2022francis Dickens is here to follow up and chemotherapy. Her  is with her. She was recently diagnosed with invasive well to moderately differentiated ductal carcinoma of the left breast, ER/KS positive and Her-2 negative, s/p biopsy. Clinical stage: [id number]. Her staging CT c/a/p did not show evidence of distant mets. She had her port placed by IR on 4/22/22. She had echo done with Dr Griffith on Monday. Her LVEF is normal 55-60%. She is scheduled for Bone scan on Friday. She feels well, gets intermittent left breast/axilla pain.\par \par 5/11/22- Chrissie is here for follow up visit for chemotherapy.  She was recently diagnosed with invasive well to moderately differentiated ductal carcinoma of the left breast, ER/KS positive and Her-2 negative, s/p biopsy. Clinical stage: [id number]. Her staging CT c/a/p did not show evidence of distant mets. Her Bone scan was negative as well. Her Echo showed 55-60% EF and she had port placed and received 1st dose of AC. She tolerated the treatment well except for some fatigue and some nausea. No fevers, chills, vomiting, diarrhea, SOB, CP. She also tolerated the Neulasta well. \par \par 5/23/22francis Dickens is here for follow up visit for chemotherapy.  She was recently diagnosed with invasive well to moderately differentiated ductal carcinoma of the left breast, ER/KS positive and Her-2 negative, s/p biopsy. Clinical stage: [id number]. Her staging CT c/a/p did not show evidence of distant mets. Her Bone scan was negative as well. Her Echo showed 55-60% EF. She started on neoadjuvant chemo with dose dense AC. To date, she has received 3 cycles. She complains eye irritation and sensitive in her mouth. Constipation has resolved.\par \par 6/29/22:francis Dickens is here for follow up visit and chemotherapy.  She was recently diagnosed with invasive well to moderately differentiated ductal carcinoma of the left breast, ER/KS positive and Her-2 negative, s/p biopsy. Clinical stage: [id number]. Her staging CT c/a/p did not show evidence of distant mets. Her Bone scan was negative as well. Her Echo showed 55-60% EF. She has been on neoadjuvant chemo with dose dense AC. She finished 4 cycles of AC and is to start weekly Taxol. Her eye irritation has got better. She feels very upset because her insurance will not allow her to continue her treatment here. \par

## 2022-07-05 NOTE — PHYSICAL EXAM
[Fully active, able to carry on all pre-disease performance without restriction] : Status 0 - Fully active, able to carry on all pre-disease performance without restriction [Normal] : affect appropriate [de-identified] : Left breast mass has decreased in size.  [de-identified] : right port-A-cath intact, no erythema noted

## 2022-07-14 ENCOUNTER — NON-APPOINTMENT (OUTPATIENT)
Age: 52
End: 2022-07-14

## 2022-07-21 ENCOUNTER — NON-APPOINTMENT (OUTPATIENT)
Age: 52
End: 2022-07-21

## 2022-07-28 ENCOUNTER — APPOINTMENT (OUTPATIENT)
Dept: BREAST CENTER | Facility: CLINIC | Age: 52
End: 2022-07-28

## 2022-08-23 ENCOUNTER — NON-APPOINTMENT (OUTPATIENT)
Age: 52
End: 2022-08-23

## 2022-08-31 ENCOUNTER — NON-APPOINTMENT (OUTPATIENT)
Age: 52
End: 2022-08-31

## 2022-08-31 LAB — HCG SERPL-MCNC: <0.6 MIU/ML

## 2022-09-19 ENCOUNTER — APPOINTMENT (OUTPATIENT)
Dept: OBGYN | Facility: CLINIC | Age: 52
End: 2022-09-19

## 2022-10-12 NOTE — ED ADULT NURSE NOTE - NSIMPLEMENTINTERV_GEN_ALL_ED
DISPLAY PLAN FREE TEXT Implemented All Universal Safety Interventions:  [place] to call system. Call bell, personal items and telephone within reach. Instruct patient to call for assistance. Room bathroom lighting operational. Non-slip footwear when patient is off stretcher. Physically safe environment: no spills, clutter or unnecessary equipment. Stretcher in lowest position, wheels locked, appropriate side rails in place.

## 2022-10-28 ENCOUNTER — APPOINTMENT (OUTPATIENT)
Dept: OBGYN | Facility: CLINIC | Age: 52
End: 2022-10-28

## 2022-10-28 ENCOUNTER — OUTPATIENT (OUTPATIENT)
Dept: OUTPATIENT SERVICES | Facility: HOSPITAL | Age: 52
LOS: 1 days | Discharge: HOME | End: 2022-10-28

## 2022-10-28 VITALS — BODY MASS INDEX: 32.62 KG/M2 | WEIGHT: 193 LBS | SYSTOLIC BLOOD PRESSURE: 146 MMHG | DIASTOLIC BLOOD PRESSURE: 108 MMHG

## 2022-10-28 DIAGNOSIS — Z90.49 ACQUIRED ABSENCE OF OTHER SPECIFIED PARTS OF DIGESTIVE TRACT: Chronic | ICD-10-CM

## 2022-10-28 DIAGNOSIS — N91.2 AMENORRHEA, UNSPECIFIED: ICD-10-CM

## 2022-10-28 DIAGNOSIS — Z98.890 OTHER SPECIFIED POSTPROCEDURAL STATES: Chronic | ICD-10-CM

## 2022-10-28 PROCEDURE — 99214 OFFICE O/P EST MOD 30 MIN: CPT

## 2022-10-28 NOTE — DISCUSSION/SUMMARY
[FreeTextEntry1] : 53yo, undergoing treatment of breast ca, ER/DC pos, w/ amenorrhea\par -discussed amenorrhea is likely related to chemoteraphy and ovarin failure as a result\par -uterus on exam normal size\par -STD screening sent\par -pelvic US to eval pelvic discomfort, pt has h/o small ovarian cyst last year\par -f/u results, f/u PRN, continue care for breast ca.\par -referral to social work placed

## 2022-10-28 NOTE — HISTORY OF PRESENT ILLNESS
[FreeTextEntry1] : 53yo P2 here for evaluation of amenorrhea\par LMP 4 months ago.\par Pt concerned that she is pregnant, she is sexually active w/ 1 partner for 6 mo and stopped having menses about 5 months ago. Currently undergoing chemo, planning for b/l mastectomy for stage 3 breast ca - ER/RI positive.\par Pt w/ significant social issues - only receving 500$/mo child support and 450 in food stamps, can't afford mortgage, recently  from  during pandemic.\par Had HCG level in August which was negative

## 2022-10-31 LAB
C TRACH RRNA SPEC QL NAA+PROBE: NOT DETECTED
N GONORRHOEA RRNA SPEC QL NAA+PROBE: NOT DETECTED
SOURCE AMPLIFICATION: NORMAL

## 2022-11-10 ENCOUNTER — NON-APPOINTMENT (OUTPATIENT)
Age: 52
End: 2022-11-10

## 2022-11-14 ENCOUNTER — NON-APPOINTMENT (OUTPATIENT)
Age: 52
End: 2022-11-14

## 2023-01-19 NOTE — H&P PST ADULT - HEMATOLOGY/LYMPHATICS
[facility]    Orthopedic Consultation    Janet Regan MRN# [id number]   Age: 70 year old YOB: 1953     Date of Admission: 1/18/Artem Salazar MD;  Location:  HEART CARDIAC CATH LAB     [id number]]                                                         Extra Red Top Tube[[id number]]                                                          Extra Green Top (Lithium...[[id number]]                                                 Extra Purple Top  Tube[[id number]]                                                       Extra Blood Bank Purple ...[[id number]]                                                   Renetta Buenrostro PA-C  [facility]        negative

## 2023-03-15 ENCOUNTER — APPOINTMENT (OUTPATIENT)
Dept: ORTHOPEDIC SURGERY | Facility: CLINIC | Age: 53
End: 2023-03-15
Payer: COMMERCIAL

## 2023-03-15 DIAGNOSIS — M54.16 RADICULOPATHY, LUMBAR REGION: ICD-10-CM

## 2023-03-15 PROCEDURE — 99204 OFFICE O/P NEW MOD 45 MIN: CPT

## 2023-03-15 PROCEDURE — 72110 X-RAY EXAM L-2 SPINE 4/>VWS: CPT

## 2023-03-15 RX ORDER — METHYLPREDNISOLONE 4 MG/1
4 TABLET ORAL
Qty: 1 | Refills: 0 | Status: ACTIVE | COMMUNITY
Start: 2023-03-15 | End: 1900-01-01

## 2023-03-15 NOTE — DATA REVIEWED
[FreeTextEntry1] : I obtained AP lateral flexion-extension lumbar x-rays.  There is no instability.  There is a scoliosis.  There are some degenerative disc disease.

## 2023-03-15 NOTE — PHYSICAL EXAM
[de-identified] : TTP midline spine and paraspinal musculature \par Strength                                         \par Hip flexor\par   Right: 5/5; Left: 5/5                             \par Knee extensor  \par   Right: 5/5; Left: 5/5                     \par Ankle dorsiflexion\par   Right: 5/5; Left: 5/5                  \par EHL        \par   Right: 5/5; Left: 5/5                                \par Ankle plantarflexion    \par   Right: 5/5; Left: 5/5\par \par Sensation\par L1\par   Right: 2/2; Left: 2/2\par L2\par   Right: 2/2; Left: 2/2\par L3\par   Right: 2/2; Left: 2/2\par L4\par   Right: 2/2; Left: 2/2\par L5\par   Right: 2/2; Left: 2/2\par S1\par   Right: 2/2; Left: 2/2\par \par Reflexes\par Patella\par   Right: 2+; Left 2+\par Achilles\par   Right: 2+; Left 2+\par Clonus\par  Right: absent; L: absent\par

## 2023-03-15 NOTE — HISTORY OF PRESENT ILLNESS
[de-identified] : 52-year-old female presents to me with 5-day history of low back pain radiating down her entire right leg and into her foot.  The pain is so significant that it is worse at night.  The patient also has a history of stage III breast cancer.  Her most recent imaging was in May 2022 and she did not have mets diastases into her spine however she did have metastasis to her lymphatic system.  Denies numbness or tingling.  She denies loss of bladder or bowel.  Denies fevers or chills.  Has been taking Tylenol and Advil without any relief.

## 2023-03-15 NOTE — DISCUSSION/SUMMARY
[de-identified] : 52-year-old female with lumbar radiculopathy.  Given that the patient has history of cancer and now has acute radicular pain as well as leg pain these are red flag signs and I am ordering an MRI of her lumbar spine.  I am also giving the patient physical therapy and a Medrol Dosepak.  I will see the patient back after this imaging is done.

## 2023-03-31 NOTE — ASU PATIENT PROFILE, ADULT - NSTRANFUSIONPLAN_GEN_ALL_CORE_SIUH
Health  Wellness Visit Note    Name: Magdalena Mane  Clinic Number: [id number]  Physician: No ref. provider found  Diagnosis: No diagnosis found.  Past Medical History:   Diagnosis Date    Anemia     Atrial fibrillation     Basal cell carcinoma     DJD (degenerative joint disease) 12/12/2012    Obesity (BMI 30-39.9) 11/27/2015    Vaginal atrophy 4/1/2016     Visit Number: [id number]  Precautions: Atrial Fibrillation      1st PT visit: 5/17/2022  Year of care end date: 5/17/2023  6 Month test  Performed: Nov 2022  12 Month test performed: May 2023  Mind body plan: Plan A 9 months  Patient level: B    Time In: 11:10 AM   Time Out: 11:53 AM  Total Treatment Time: 43 minutes    Wellness [employer] 2022  Handout on this week's wellness topic Fruits and Vegetables was provided along with a discussion on what it means, the benefits, and suggestions for practice. Reviewed last week's topic Anti-Inflammatory Diet.    Subjective:   Patient reports that her low back is pain-free today. Pt completes her stretches on a daily basis, but does not typically ice her back at home. Pt plans to get back on track with her at-home strengthening exercises this week; she's been gardening regularly with no problems.    Objective:   Magdalena completed therapeutic stretches (EIL, ABRAHAM) and the following MedX exercise machines: core lumbar, torso rotation l/r, leg extension, leg curl, upright row, chest press, biceps curl, triceps extension, leg press    See exercise log in patient folder for rate of exertion and repetitions completed.       Fitness Machine Education Key:  E=education on equipment initiated and further follow up and education needed  I=independent with  and exercise.  The patient:  Adjusts machines to his/her settings  Uses equipment levers, pins, weights safely  Maintains safe and correct posture while exercising  Moves through exercise with correct pace and control  Gets on and off equipment safely      Core Lumbar  Strength E Torso Rotation E Leg Press E   Leg Extension E Seated Leg Curl E Chest Press E   Seated Row E Hip ADD X Hip ABD E   Triceps Extension E Bicep Curl E Other: X       Assessment:   Patient tolerated Patient tolerated MedX Core Lumbar Strength and all other peripheral exercises without an increase in symptoms. Patient warmed up on treadmill for 5 minutes, stretched, and iced low back and knees for 5 minutes after the workout.    Plan:  Continue with established plan of care towards wellness goals.     Health  : Sho Morfin  3/31/2023                   not applicable

## 2023-04-05 ENCOUNTER — EMERGENCY (EMERGENCY)
Facility: HOSPITAL | Age: 53
LOS: 0 days | Discharge: HOME | End: 2023-04-05
Attending: EMERGENCY MEDICINE
Payer: COMMERCIAL

## 2023-04-05 VITALS
SYSTOLIC BLOOD PRESSURE: 118 MMHG | WEIGHT: 201.06 LBS | HEIGHT: 66 IN | DIASTOLIC BLOOD PRESSURE: 77 MMHG | HEART RATE: 92 BPM | TEMPERATURE: 99 F | RESPIRATION RATE: 16 BRPM | OXYGEN SATURATION: 97 %

## 2023-04-05 DIAGNOSIS — R07.89 OTHER CHEST PAIN: ICD-10-CM

## 2023-04-05 DIAGNOSIS — Z88.1 ALLERGY STATUS TO OTHER ANTIBIOTIC AGENTS STATUS: ICD-10-CM

## 2023-04-05 DIAGNOSIS — Z85.3 PERSONAL HISTORY OF MALIGNANT NEOPLASM OF BREAST: ICD-10-CM

## 2023-04-05 DIAGNOSIS — Z98.890 OTHER SPECIFIED POSTPROCEDURAL STATES: Chronic | ICD-10-CM

## 2023-04-05 DIAGNOSIS — E03.9 HYPOTHYROIDISM, UNSPECIFIED: ICD-10-CM

## 2023-04-05 DIAGNOSIS — Z90.49 ACQUIRED ABSENCE OF OTHER SPECIFIED PARTS OF DIGESTIVE TRACT: Chronic | ICD-10-CM

## 2023-04-05 DIAGNOSIS — Z90.49 ACQUIRED ABSENCE OF OTHER SPECIFIED PARTS OF DIGESTIVE TRACT: ICD-10-CM

## 2023-04-05 DIAGNOSIS — Z91.040 LATEX ALLERGY STATUS: ICD-10-CM

## 2023-04-05 DIAGNOSIS — Z86.16 PERSONAL HISTORY OF COVID-19: ICD-10-CM

## 2023-04-05 DIAGNOSIS — Z90.12 ACQUIRED ABSENCE OF LEFT BREAST AND NIPPLE: ICD-10-CM

## 2023-04-05 DIAGNOSIS — R07.81 PLEURODYNIA: ICD-10-CM

## 2023-04-05 LAB
ALBUMIN SERPL ELPH-MCNC: 3.9 G/DL — SIGNIFICANT CHANGE UP (ref 3.5–5.2)
ALP SERPL-CCNC: 123 U/L — HIGH (ref 30–115)
ALT FLD-CCNC: 10 U/L — SIGNIFICANT CHANGE UP (ref 0–41)
ANION GAP SERPL CALC-SCNC: 9 MMOL/L — SIGNIFICANT CHANGE UP (ref 7–14)
AST SERPL-CCNC: 13 U/L — SIGNIFICANT CHANGE UP (ref 0–41)
BASOPHILS # BLD AUTO: 0.06 K/UL — SIGNIFICANT CHANGE UP (ref 0–0.2)
BASOPHILS NFR BLD AUTO: 0.7 % — SIGNIFICANT CHANGE UP (ref 0–1)
BILIRUB SERPL-MCNC: <0.2 MG/DL — SIGNIFICANT CHANGE UP (ref 0.2–1.2)
BUN SERPL-MCNC: 12 MG/DL — SIGNIFICANT CHANGE UP (ref 10–20)
CALCIUM SERPL-MCNC: 9.1 MG/DL — SIGNIFICANT CHANGE UP (ref 8.4–10.5)
CHLORIDE SERPL-SCNC: 106 MMOL/L — SIGNIFICANT CHANGE UP (ref 98–110)
CO2 SERPL-SCNC: 29 MMOL/L — SIGNIFICANT CHANGE UP (ref 17–32)
CREAT SERPL-MCNC: 0.7 MG/DL — SIGNIFICANT CHANGE UP (ref 0.7–1.5)
EGFR: 105 ML/MIN/1.73M2 — SIGNIFICANT CHANGE UP
EOSINOPHIL # BLD AUTO: 0.22 K/UL — SIGNIFICANT CHANGE UP (ref 0–0.7)
EOSINOPHIL NFR BLD AUTO: 2.4 % — SIGNIFICANT CHANGE UP (ref 0–8)
GLUCOSE SERPL-MCNC: 107 MG/DL — HIGH (ref 70–99)
HCG SERPL QL: NEGATIVE — SIGNIFICANT CHANGE UP
HCT VFR BLD CALC: 34.1 % — LOW (ref 37–47)
HGB BLD-MCNC: 11.1 G/DL — LOW (ref 12–16)
IMM GRANULOCYTES NFR BLD AUTO: 0.5 % — HIGH (ref 0.1–0.3)
LIDOCAIN IGE QN: 33 U/L — SIGNIFICANT CHANGE UP (ref 7–60)
LYMPHOCYTES # BLD AUTO: 1.16 K/UL — LOW (ref 1.2–3.4)
LYMPHOCYTES # BLD AUTO: 12.6 % — LOW (ref 20.5–51.1)
MAGNESIUM SERPL-MCNC: 1.9 MG/DL — SIGNIFICANT CHANGE UP (ref 1.8–2.4)
MCHC RBC-ENTMCNC: 31.3 PG — HIGH (ref 27–31)
MCHC RBC-ENTMCNC: 32.6 G/DL — SIGNIFICANT CHANGE UP (ref 32–37)
MCV RBC AUTO: 96.1 FL — SIGNIFICANT CHANGE UP (ref 81–99)
MONOCYTES # BLD AUTO: 1.19 K/UL — HIGH (ref 0.1–0.6)
MONOCYTES NFR BLD AUTO: 12.9 % — HIGH (ref 1.7–9.3)
NEUTROPHILS # BLD AUTO: 6.55 K/UL — HIGH (ref 1.4–6.5)
NEUTROPHILS NFR BLD AUTO: 70.9 % — SIGNIFICANT CHANGE UP (ref 42.2–75.2)
NRBC # BLD: 0 /100 WBCS — SIGNIFICANT CHANGE UP (ref 0–0)
NT-PROBNP SERPL-SCNC: 61 PG/ML — SIGNIFICANT CHANGE UP (ref 0–300)
PLATELET # BLD AUTO: 305 K/UL — SIGNIFICANT CHANGE UP (ref 130–400)
POTASSIUM SERPL-MCNC: 4.4 MMOL/L — SIGNIFICANT CHANGE UP (ref 3.5–5)
POTASSIUM SERPL-SCNC: 4.4 MMOL/L — SIGNIFICANT CHANGE UP (ref 3.5–5)
PROT SERPL-MCNC: 6.3 G/DL — SIGNIFICANT CHANGE UP (ref 6–8)
RBC # BLD: 3.55 M/UL — LOW (ref 4.2–5.4)
RBC # FLD: 15 % — HIGH (ref 11.5–14.5)
SODIUM SERPL-SCNC: 144 MMOL/L — SIGNIFICANT CHANGE UP (ref 135–146)
TROPONIN T SERPL-MCNC: <0.01 NG/ML — SIGNIFICANT CHANGE UP
WBC # BLD: 9.23 K/UL — SIGNIFICANT CHANGE UP (ref 4.8–10.8)
WBC # FLD AUTO: 9.23 K/UL — SIGNIFICANT CHANGE UP (ref 4.8–10.8)

## 2023-04-05 PROCEDURE — 84703 CHORIONIC GONADOTROPIN ASSAY: CPT

## 2023-04-05 PROCEDURE — 85025 COMPLETE CBC W/AUTO DIFF WBC: CPT

## 2023-04-05 PROCEDURE — 71045 X-RAY EXAM CHEST 1 VIEW: CPT | Mod: 26

## 2023-04-05 PROCEDURE — 71275 CT ANGIOGRAPHY CHEST: CPT | Mod: MA

## 2023-04-05 PROCEDURE — 93010 ELECTROCARDIOGRAM REPORT: CPT

## 2023-04-05 PROCEDURE — 83690 ASSAY OF LIPASE: CPT

## 2023-04-05 PROCEDURE — 84484 ASSAY OF TROPONIN QUANT: CPT

## 2023-04-05 PROCEDURE — 96374 THER/PROPH/DIAG INJ IV PUSH: CPT

## 2023-04-05 PROCEDURE — 71045 X-RAY EXAM CHEST 1 VIEW: CPT

## 2023-04-05 PROCEDURE — 71275 CT ANGIOGRAPHY CHEST: CPT | Mod: 26,MA

## 2023-04-05 PROCEDURE — 99285 EMERGENCY DEPT VISIT HI MDM: CPT | Mod: 25

## 2023-04-05 PROCEDURE — 36415 COLL VENOUS BLD VENIPUNCTURE: CPT

## 2023-04-05 PROCEDURE — 99285 EMERGENCY DEPT VISIT HI MDM: CPT

## 2023-04-05 PROCEDURE — 96375 TX/PRO/DX INJ NEW DRUG ADDON: CPT

## 2023-04-05 PROCEDURE — 80053 COMPREHEN METABOLIC PANEL: CPT

## 2023-04-05 PROCEDURE — 83880 ASSAY OF NATRIURETIC PEPTIDE: CPT

## 2023-04-05 PROCEDURE — 93005 ELECTROCARDIOGRAM TRACING: CPT

## 2023-04-05 PROCEDURE — 83735 ASSAY OF MAGNESIUM: CPT

## 2023-04-05 RX ORDER — MORPHINE SULFATE 50 MG/1
4 CAPSULE, EXTENDED RELEASE ORAL ONCE
Refills: 0 | Status: DISCONTINUED | OUTPATIENT
Start: 2023-04-05 | End: 2023-04-05

## 2023-04-05 RX ORDER — METHOCARBAMOL 500 MG/1
2 TABLET, FILM COATED ORAL
Qty: 28 | Refills: 0
Start: 2023-04-05 | End: 2023-04-11

## 2023-04-05 RX ORDER — KETOROLAC TROMETHAMINE 30 MG/ML
15 SYRINGE (ML) INJECTION ONCE
Refills: 0 | Status: DISCONTINUED | OUTPATIENT
Start: 2023-04-05 | End: 2023-04-05

## 2023-04-05 RX ORDER — METHOCARBAMOL 500 MG/1
1000 TABLET, FILM COATED ORAL ONCE
Refills: 0 | Status: COMPLETED | OUTPATIENT
Start: 2023-04-05 | End: 2023-04-05

## 2023-04-05 RX ADMIN — Medication 15 MILLIGRAM(S): at 20:56

## 2023-04-05 RX ADMIN — MORPHINE SULFATE 4 MILLIGRAM(S): 50 CAPSULE, EXTENDED RELEASE ORAL at 16:57

## 2023-04-05 RX ADMIN — METHOCARBAMOL 1000 MILLIGRAM(S): 500 TABLET, FILM COATED ORAL at 20:56

## 2023-04-05 NOTE — ED PROVIDER NOTE - NSICDXPASTSURGICALHX_GEN_ALL_CORE_FT
PAST SURGICAL HISTORY:  H/O arthroscopy of knee 2018?    History of hand surgery     S/P cholecystectomy

## 2023-04-05 NOTE — ED PROVIDER NOTE - CLINICAL SUMMARY MEDICAL DECISION MAKING FREE TEXT BOX
Patient presented with left chest wall pain as well as cough that has been worsening.  Patient was supposed to have outpatient CT performed of the chest but has not yet gone, stating that her symptoms have worsened acutely over the past few days.  Otherwise on arrival to ED, patient afebrile, hemodynamically stable, normal O2 saturation on room air, speaking full sentences with no acute distress.  EKG obtained and nonischemic.  Obtained labs which were grossly unremarkable including no significant leukocytosis, anemia, signs of dehydration/GABINO, transaminitis or significant electrolyte abnormalities.  Troponin negative.  Chest xray negative for pneumothorax, pneumonia, widened mediastinum, evidence of rib fractures, enlarged cardiac silhouette or any other emergent pathologies.  CTA of the chest negative for PE as well is any other intrathoracic findings.  Pain is primarily worse with coughing and is very atypical for ACS.  Patient felt much better after treatment in the ED and patient ambulatory without desaturation, tolerating p.o.  Given the above, will discharge home with outpatient follow up. Patient agreeable with plan. Agrees to return to ED for any new or worsening symptoms.

## 2023-04-05 NOTE — ED ADULT NURSE NOTE - OBJECTIVE STATEMENT
Radiation to left breast, a few weeks later was coughing and had rib painx2 weeks - was seen by doctor and was told she might have rib fractures, has CT scan scheduled for Saturday but doctor told her to come today.

## 2023-04-05 NOTE — ED PROVIDER NOTE - OBJECTIVE STATEMENT
pt with pmhx breast ca s/p mastectomy 11/2022 followed by radiation followed at [facility], sent for eval of L sided chest wall pain- concern for rib fx 2/2 recent coughing. of note, pt was also having RLE pain recently dx as sciatica. pt had appt for CT to be done this weekend, but sts she could not wait. pain is sharp, nonradiating, moderate, worse with deep breathing/moving, denies relieving factors. Denies fever/chill/HA/dizziness/palpitation/abd pain/n/v/d/ black stool/bloody stool/urinary sxs

## 2023-04-05 NOTE — ED PROVIDER NOTE - DIFFERENTIAL DIAGNOSIS
Differential Diagnosis Side pain, primarily in the chest region. Will r/o ACS vs PE vs dissection vs pneumothorax vs pneumonia vs fluid overload

## 2023-04-05 NOTE — ED PROVIDER NOTE - PATIENT PORTAL LINK FT
You can access the FollowMyHealth Patient Portal offered by [facility] by registering at the following website: [email or web address]. By joining [employer]’s FollowMyHealth portal, you will also be able to view your health information using other applications (apps) compatible with our system.

## 2023-04-05 NOTE — ED PROVIDER NOTE - NS ED ATTENDING STATEMENT MOD
This was a shared visit with the SUKHJINDER. I reviewed and verified the documentation and independently performed the documented:

## 2023-04-05 NOTE — ED PROVIDER NOTE - NSICDXPASTMEDICALHX_GEN_ALL_CORE_FT
PAST MEDICAL HISTORY:  2019 novel coronavirus disease (COVID-19) 8/2021, 12/2021    Breast cancer LT    Hypertension Off meds    Hypothyroid

## 2023-10-20 NOTE — ED ADULT NURSE NOTE - CAS EDN DISCHARGE INTERVENTIONS
Freddy Pelayo is a 3 year old male who presents for his well child evaluation.    Dad currently having legal issues    REVIEW OF SYSTEMS:  Currently no respiratory concerns such as coughing, shortness of breath or wheezing  Recent cold-better  Bowels and voiding seems to be normal  No skin concerns    Appetite: good  Milk:  yes  Speech/language development:  normal  Motor development: normal/good  Sleep: wakes and will come in to mom`s bed (d/w mom)  Toilet training: not started    reviewed ht and wt charts    Autism screening:  Does he babble, point or use other gestures?  yes  Does he speak single words? yes  Does he have spontaneous two-word phrases? yes  Has he lost any language or social skills? No    MCHAT questionnaire completed      SOCIAL HISTORY:   Lives with: mom  Day Care: home with mom (but mom may start working in day care)  Car Seat: yes    PHYSICAL EXAM  GENERAL: Freddy Pelayo is an alert, vigorous male with appropriate behavior. He is in no acute distress.  SKIN: His skin is warm with normal turgor. The color of the skin is normal. There is no rash. There are no bruises or other signs of injury.  HEAD: The head is atraumatic and normocephalic. The fontanels are closed.  EYES: The eyelids are normal. The conjunctivae appear normal.  The corneal light reflex is symmetrical. There is no fixed deviation of gaze. Red reflexes are normal.  EARS: Exam of the ears reveals the pinnae to be normal. The external auditory canals are clear and the tympanic membranes are normal. Patient responds to the spoken word.  NOSE: There is no nasal flaring.  THROAT: The oropharynx is normal. Tonsils are not enlarged.  TEETH: The teeth are normal.  NECK: The neck is normal. The thyroid is not palpably enlarged. There are no enlarged lymph nodes in the neck.  LUNGS: The lung fields are clear to auscultation.  HEART: The precordium is quiet. The heart rhythm is grossly regular. S1 and S2 are normal. The heart tones are strong.  There are no murmurs.  ABDOMEN: There is not an umbilical hernia. The abdomen is flat, soft, and not tender. There are no masses. Neither the liver nor the spleen is enlarged. The bowel sounds are normal.  BACK: The back is normal.  EXTREMITIES: The hip exam is normal. The legs are of equal length. Galeazzi test is normal. The foot exam reveals normal feet. There is no clubbing. There is no edema.  NEUROLOGIC: Normal tone and strength throughout. He is walking normally. His speech includes several two word phrases.    ASSESSMENT/PLAN:                   Well 3 year old male child  -Normal growth and development  -update vaccines as ordered.   -Return for next well child exam in 6-12 months.      Anticipatory guidance reviewed and handouts given as needed regarding diet/nutrition, toilet training, temper tantrums (mom says tries to work through it), car seat and dental care as above       IV discontinued, cath removed intact

## 2023-12-11 ENCOUNTER — APPOINTMENT (OUTPATIENT)
Dept: ORTHOPEDIC SURGERY | Facility: CLINIC | Age: 53
End: 2023-12-11
Payer: MEDICAID

## 2023-12-11 PROCEDURE — 73562 X-RAY EXAM OF KNEE 3: CPT | Mod: RT

## 2023-12-11 PROCEDURE — 99203 OFFICE O/P NEW LOW 30 MIN: CPT

## 2023-12-19 ENCOUNTER — APPOINTMENT (OUTPATIENT)
Dept: OBGYN | Facility: CLINIC | Age: 53
End: 2023-12-19

## 2023-12-20 ENCOUNTER — APPOINTMENT (OUTPATIENT)
Dept: ORTHOPEDIC SURGERY | Facility: CLINIC | Age: 53
End: 2023-12-20
Payer: MEDICAID

## 2023-12-20 PROCEDURE — 99213 OFFICE O/P EST LOW 20 MIN: CPT | Mod: 25

## 2023-12-20 PROCEDURE — 20610 DRAIN/INJ JOINT/BURSA W/O US: CPT | Mod: RT

## 2023-12-20 NOTE — PROCEDURE
[Large Joint Injection] : Large joint injection [Right] : of the right [Knee] : knee [Alcohol] : alcohol [Betadine] : betadine [___ cc    1%] : Lidocaine ~Vcc of 1%  [___ cc    4mg] : Dexamethasone (Decadron) ~Vcc of 4 mg  [Risks, benefits, alternatives discussed / Verbal consent obtained] : the risks benefits, and alternatives have been discussed, and verbal consent was obtained

## 2023-12-20 NOTE — HISTORY OF PRESENT ILLNESS
[de-identified] : The patient is a 53-year-old female here for an evaluation of her right knee.  She is status post right knee arthroscopy, partial lateral meniscectomy in October 2020 with Dr. Pinon.  She states she had a difficult recovery from the operation.  That she has had pain in that right knee since then.  She reports that on 9/13/2023 she tripped over broken concrete landing on her right knee.  Since then she has noticed pain and swelling.  She has tried over-the-counter Advil which helps the pain a little bit.  She has tried DMSO cream as well as Australian oil which both have provided her with some relief.  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  She sees  at [facility] for [facility].  She is scheduled on 1/25/2024 with Dr. Larsen for a breast reconstruction.  She spoke with her oncologist and she was advised she can have a cortisone injection for the right knee.  She also states that one of the IA's that she is on for the cancer is what caused the arthritis in her knee to worsen according to her oncologist.

## 2023-12-20 NOTE — IMAGING
[de-identified] : Mild effusion.  No erythema or ecchymosis.  Full extension, flexion to 120 degrees.  Slight valgus alignment.   Tenderness to palpation over the medial joint line.  No lateral joint line tenderness to palpation.  No tenderness to palpation over the collateral ligaments or the anterior aspect of the knee.  Intact to light touch, nonantalgic gait.

## 2023-12-31 PROBLEM — Z20.2 POSSIBLE EXPOSURE TO STD: Status: ACTIVE | Noted: 2021-09-15

## 2024-03-05 ENCOUNTER — APPOINTMENT (OUTPATIENT)
Dept: ORTHOPEDIC SURGERY | Facility: CLINIC | Age: 54
End: 2024-03-05
Payer: MEDICAID

## 2024-03-05 VITALS — HEIGHT: 66 IN | BODY MASS INDEX: 32.47 KG/M2 | WEIGHT: 202 LBS

## 2024-03-05 PROCEDURE — 20611 DRAIN/INJ JOINT/BURSA W/US: CPT | Mod: RT

## 2024-03-05 PROCEDURE — 99213 OFFICE O/P EST LOW 20 MIN: CPT | Mod: 25

## 2024-03-05 NOTE — PROCEDURE
[Right] : of the right [Large Joint Injection] : Large joint injection [___ cc    1%] : Lidocaine ~Vcc of 1%  [Knee] : knee [Risks, benefits, alternatives discussed / Verbal consent obtained] : the risks benefits, and alternatives have been discussed, and verbal consent was obtained [___ cc    4mg] : Dexamethasone (Decadron) ~Vcc of 4 mg  [Visualization of the needle and placement of injection was performed without complication] : visualization of the needle and placement of injection was performed without complication [All ultrasound images have been permanently captured and stored accordingly in our picture archiving and communication system] : All ultrasound images have been permanently captured and stored accordingly in our picture archiving and communication system

## 2024-03-05 NOTE — DISCUSSION/SUMMARY
[de-identified] : Today I recommend a cortisone injection for the right knee, the patient agreed.  The right knee was injected with 2 cc lidocaine, 1 cc dexamethasone.  Procedure note generated.  She will do home therapy exercises, she was given a printout from [employer] for knee conditioning program.  I will see her back in 3 months for further evaluation.  I showed her how to do long and short arc quads here in the office.

## 2024-03-05 NOTE — IMAGING
[de-identified] : Physical exam of the right knee: No effusion, erythema, no ecchymosis.  There is valgus alignment.  No tenderness to palpation over the medial joint line.  Mild tenderness to palpation over the medial and lateral patellar facets.  Mild tenderness to palpation over the lateral joint line.  Stable to varus valgus stress testing.  Good range of motion.  Intact to light touch, mild antalgic gait.

## 2024-03-20 ENCOUNTER — APPOINTMENT (OUTPATIENT)
Dept: ORTHOPEDIC SURGERY | Facility: CLINIC | Age: 54
End: 2024-03-20

## 2024-06-04 ENCOUNTER — APPOINTMENT (OUTPATIENT)
Dept: ORTHOPEDIC SURGERY | Facility: CLINIC | Age: 54
End: 2024-06-04
Payer: MEDICAID

## 2024-06-04 DIAGNOSIS — M17.11 UNILATERAL PRIMARY OSTEOARTHRITIS, RIGHT KNEE: ICD-10-CM

## 2024-06-04 PROCEDURE — 99213 OFFICE O/P EST LOW 20 MIN: CPT

## 2024-06-04 NOTE — IMAGING
[de-identified] : Physical exam of the right knee: No effusion, no erythema, no ecchymosis.  There is valgus alignment.  Mild tenderness to palpation over the medial joint line, tenderness to palpation over the lateral joint line.  Full extension, flexion to 110 degrees with pain.  Stable varus valgus stress testing.  Intact to light touch, mild antalgic gait.

## 2024-06-04 NOTE — HISTORY OF PRESENT ILLNESS
[de-identified] : The patient is a 54-year-old female here for a subsequent reevaluation of her right knee.  She has severe lateral compartment space osteoarthritis.  She is still getting a lot of pain in her right knee.  She has converted to a home therapy program.  She had a cortisone injection here on 3/5/2024 which helped but wore off very quickly.  She has pain in the right knee on a daily basis.

## 2024-06-04 NOTE — DISCUSSION/SUMMARY
[de-identified] : At this point I recommend viscous injections, please send authorization for Orthovisc injections for the right knee.  She has had a cortisone injection and home therapy exercises with no relief.  In my opinion she would benefit from these injections.

## 2024-07-15 ENCOUNTER — APPOINTMENT (OUTPATIENT)
Dept: ORTHOPEDIC SURGERY | Facility: CLINIC | Age: 54
End: 2024-07-15

## 2024-07-22 ENCOUNTER — APPOINTMENT (OUTPATIENT)
Dept: ORTHOPEDIC SURGERY | Facility: CLINIC | Age: 54
End: 2024-07-22

## 2024-07-29 ENCOUNTER — APPOINTMENT (OUTPATIENT)
Dept: ORTHOPEDIC SURGERY | Facility: CLINIC | Age: 54
End: 2024-07-29

## 2024-09-08 ENCOUNTER — NON-APPOINTMENT (OUTPATIENT)
Age: 54
End: 2024-09-08

## 2024-10-15 ENCOUNTER — EMERGENCY (EMERGENCY)
Facility: HOSPITAL | Age: 54
LOS: 0 days | Discharge: HOME | End: 2024-10-16
Attending: STUDENT IN AN ORGANIZED HEALTH CARE EDUCATION/TRAINING PROGRAM
Payer: MEDICARE

## 2024-10-15 VITALS
TEMPERATURE: 99 F | DIASTOLIC BLOOD PRESSURE: 87 MMHG | SYSTOLIC BLOOD PRESSURE: 131 MMHG | OXYGEN SATURATION: 99 % | RESPIRATION RATE: 18 BRPM | HEART RATE: 101 BPM | WEIGHT: 203.93 LBS

## 2024-10-15 DIAGNOSIS — Z88.1 ALLERGY STATUS TO OTHER ANTIBIOTIC AGENTS: ICD-10-CM

## 2024-10-15 DIAGNOSIS — Z90.49 ACQUIRED ABSENCE OF OTHER SPECIFIED PARTS OF DIGESTIVE TRACT: Chronic | ICD-10-CM

## 2024-10-15 DIAGNOSIS — I10 ESSENTIAL (PRIMARY) HYPERTENSION: ICD-10-CM

## 2024-10-15 DIAGNOSIS — K57.90 DIVERTICULOSIS OF INTESTINE, PART UNSPECIFIED, WITHOUT PERFORATION OR ABSCESS WITHOUT BLEEDING: ICD-10-CM

## 2024-10-15 DIAGNOSIS — Z85.3 PERSONAL HISTORY OF MALIGNANT NEOPLASM OF BREAST: ICD-10-CM

## 2024-10-15 DIAGNOSIS — R29.898 OTHER SYMPTOMS AND SIGNS INVOLVING THE MUSCULOSKELETAL SYSTEM: ICD-10-CM
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
## 2024-10-15 DIAGNOSIS — Z98.890 OTHER SPECIFIED POSTPROCEDURAL STATES: Chronic | ICD-10-CM

## 2024-10-15 DIAGNOSIS — R10.31 RIGHT LOWER QUADRANT PAIN: ICD-10-CM

## 2024-10-15 DIAGNOSIS — E03.9 HYPOTHYROIDISM, UNSPECIFIED: ICD-10-CM

## 2024-10-15 DIAGNOSIS — R10.32 LEFT LOWER QUADRANT PAIN: ICD-10-CM

## 2024-10-15 DIAGNOSIS — R19.7 DIARRHEA, UNSPECIFIED: ICD-10-CM

## 2024-10-15 DIAGNOSIS — M54.50 LOW BACK PAIN, UNSPECIFIED: ICD-10-CM

## 2024-10-15 DIAGNOSIS — Z91.040 LATEX ALLERGY STATUS: ICD-10-CM

## 2024-10-15 DIAGNOSIS — R11.2 NAUSEA WITH VOMITING, UNSPECIFIED: ICD-10-CM
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
## 2024-10-15 DIAGNOSIS — R10.2 PELVIC AND PERINEAL PAIN: ICD-10-CM

## 2024-10-15 LAB
ALBUMIN SERPL ELPH-MCNC: 4.1 G/DL — SIGNIFICANT CHANGE UP (ref 3.5–5.2)
ALP SERPL-CCNC: 144 U/L — HIGH (ref 30–115)
ALT FLD-CCNC: 7 U/L — SIGNIFICANT CHANGE UP (ref 0–41)
ANION GAP SERPL CALC-SCNC: 10 MMOL/L — SIGNIFICANT CHANGE UP (ref 7–14)
AST SERPL-CCNC: 13 U/L — SIGNIFICANT CHANGE UP (ref 0–41)
BASOPHILS # BLD AUTO: 0.13 K/UL — SIGNIFICANT CHANGE UP (ref 0–0.2)
BASOPHILS NFR BLD AUTO: 1.4 % — HIGH (ref 0–1)
BILIRUB SERPL-MCNC: <0.2 MG/DL — SIGNIFICANT CHANGE UP (ref 0.2–1.2)
BUN SERPL-MCNC: 13 MG/DL — SIGNIFICANT CHANGE UP (ref 10–20)
CALCIUM SERPL-MCNC: 9.2 MG/DL — SIGNIFICANT CHANGE UP (ref 8.4–10.5)
CHLORIDE SERPL-SCNC: 105 MMOL/L — SIGNIFICANT CHANGE UP (ref 98–110)
CO2 SERPL-SCNC: 27 MMOL/L — SIGNIFICANT CHANGE UP (ref 17–32)
CREAT SERPL-MCNC: 1 MG/DL — SIGNIFICANT CHANGE UP (ref 0.7–1.5)
EGFR: 67 ML/MIN/1.73M2 — SIGNIFICANT CHANGE UP
EOSINOPHIL # BLD AUTO: 0.21 K/UL — SIGNIFICANT CHANGE UP (ref 0–0.7)
EOSINOPHIL NFR BLD AUTO: 2.2 % — SIGNIFICANT CHANGE UP (ref 0–8)
GLUCOSE SERPL-MCNC: 95 MG/DL — SIGNIFICANT CHANGE UP (ref 70–99)
HCG SERPL QL: NEGATIVE — SIGNIFICANT CHANGE UP
HCT VFR BLD CALC: 35.6 % — LOW (ref 37–47)
HGB BLD-MCNC: 11.7 G/DL — LOW (ref 12–16)
IMM GRANULOCYTES NFR BLD AUTO: 0.6 % — HIGH (ref 0.1–0.3)
LIDOCAIN IGE QN: 32 U/L — SIGNIFICANT CHANGE UP (ref 7–60)
LYMPHOCYTES # BLD AUTO: 2.1 K/UL — SIGNIFICANT CHANGE UP (ref 1.2–3.4)
LYMPHOCYTES # BLD AUTO: 22 % — SIGNIFICANT CHANGE UP (ref 20.5–51.1)
MCHC RBC-ENTMCNC: 32.9 G/DL — SIGNIFICANT CHANGE UP (ref 32–37)
MCHC RBC-ENTMCNC: 32.9 PG — HIGH (ref 27–31)
MCV RBC AUTO: 100 FL — HIGH (ref 81–99)
MONOCYTES # BLD AUTO: 1.07 K/UL — HIGH (ref 0.1–0.6)
MONOCYTES NFR BLD AUTO: 11.2 % — HIGH (ref 1.7–9.3)
NEUTROPHILS # BLD AUTO: 5.98 K/UL — SIGNIFICANT CHANGE UP (ref 1.4–6.5)
NEUTROPHILS NFR BLD AUTO: 62.6 % — SIGNIFICANT CHANGE UP (ref 42.2–75.2)
NRBC # BLD: 0 /100 WBCS — SIGNIFICANT CHANGE UP (ref 0–0)
PLATELET # BLD AUTO: 336 K/UL — SIGNIFICANT CHANGE UP (ref 130–400)
PMV BLD: 8.4 FL — SIGNIFICANT CHANGE UP (ref 7.4–10.4)
POTASSIUM SERPL-MCNC: 4.4 MMOL/L — SIGNIFICANT CHANGE UP (ref 3.5–5)
POTASSIUM SERPL-SCNC: 4.4 MMOL/L — SIGNIFICANT CHANGE UP (ref 3.5–5)
PROT SERPL-MCNC: 6.8 G/DL — SIGNIFICANT CHANGE UP (ref 6–8)
RBC # BLD: 3.56 M/UL — LOW (ref 4.2–5.4)
RBC # FLD: 13.3 % — SIGNIFICANT CHANGE UP (ref 11.5–14.5)
SODIUM SERPL-SCNC: 142 MMOL/L — SIGNIFICANT CHANGE UP (ref 135–146)
WBC # BLD: 9.55 K/UL — SIGNIFICANT CHANGE UP (ref 4.8–10.8)
WBC # FLD AUTO: 9.55 K/UL — SIGNIFICANT CHANGE UP (ref 4.8–10.8)

## 2024-10-15 PROCEDURE — 99284 EMERGENCY DEPT VISIT MOD MDM: CPT | Mod: 25

## 2024-10-15 PROCEDURE — 99285 EMERGENCY DEPT VISIT HI MDM: CPT

## 2024-10-15 PROCEDURE — 85025 COMPLETE CBC W/AUTO DIFF WBC: CPT

## 2024-10-15 PROCEDURE — 74177 CT ABD & PELVIS W/CONTRAST: CPT | Mod: 26,MC

## 2024-10-15 PROCEDURE — 80053 COMPREHEN METABOLIC PANEL: CPT

## 2024-10-15 PROCEDURE — 96374 THER/PROPH/DIAG INJ IV PUSH: CPT | Mod: XU

## 2024-10-15 PROCEDURE — 83690 ASSAY OF LIPASE: CPT

## 2024-10-15 PROCEDURE — 81003 URINALYSIS AUTO W/O SCOPE: CPT

## 2024-10-15 PROCEDURE — 87040 BLOOD CULTURE FOR BACTERIA: CPT

## 2024-10-15 PROCEDURE — 74177 CT ABD & PELVIS W/CONTRAST: CPT | Mod: MC

## 2024-10-15 PROCEDURE — 84703 CHORIONIC GONADOTROPIN ASSAY: CPT

## 2024-10-15 PROCEDURE — 36415 COLL VENOUS BLD VENIPUNCTURE: CPT

## 2024-10-15 RX ORDER — SODIUM CHLORIDE IRRIG SOLUTION 0.9 %
1000 SOLUTION, IRRIGATION IRRIGATION ONCE
Refills: 0 | Status: COMPLETED | OUTPATIENT
Start: 2024-10-15 | End: 2024-10-15

## 2024-10-15 RX ORDER — ONDANSETRON HCL/PF 4 MG/2 ML
4 VIAL (ML) INJECTION ONCE
Refills: 0 | Status: COMPLETED | OUTPATIENT
Start: 2024-10-15 | End: 2024-10-15

## 2024-10-15 RX ADMIN — Medication 4 MILLIGRAM(S): at 19:36

## 2024-10-15 RX ADMIN — Medication 1000 MILLILITER(S): at 19:36

## 2024-10-15 NOTE — ED PROVIDER NOTE - CLINICAL SUMMARY MEDICAL DECISION MAKING FREE TEXT BOX
pt with diarrhea, nausea/vomitting    Pt feeling improved, tolerating PO, abdomen soft, non-tender, non-distended, no rebound, no guarding.    Appropriate medications for patient's presenting complaints were ordered and effects were reassessed. Patient's external records were reviewed    Escalation to admission and/or observation was considered.  Patient feels much better and is comfortable with discharge.  Appropriate follow-up was arranged.

## 2024-10-15 NOTE — ED PROVIDER NOTE - PROVIDER TOKENS
FREE:[LAST:[your primary doctor],PHONE:[(   )    -],FAX:[(   )    -],FOLLOWUP:[1-3 Days]],FREE:[LAST:[your oncologist],PHONE:[(   )    -],FAX:[(   )    -],FOLLOWUP:[1-3 Days]]

## 2024-10-15 NOTE — ED PROVIDER NOTE - PATIENT PORTAL LINK FT
You can access the FollowMyHealth Patient Portal offered by [facility] by registering at the following website: [email or web address]. By joining [employer]’s FollowMyHealth portal, you will also be able to view your health information using other applications (apps) compatible with our system.

## 2024-10-15 NOTE — ED PROVIDER NOTE - NSFOLLOWUPINSTRUCTIONS_ED_ALL_ED_FT
FOLLOW UP WITH YOUR ONCOLOGIST  FOLLOW UP WITH YOUR PRIMARY DOCTOR  RETURN TO ED FOR NEW OR WORSENING SYMPTOMS    Diarrhea    Diarrhea is frequent loose or watery bowel movements that has many causes. Diarrhea can make you feel weak and cause you to become dehydrated. Diarrhea typically lasts 2–3 days, but can last longer if it is a sign of something more serious. Drink clear fluids to prevent dehydration. Eat bland, easy-to-digest foods as tolerated.     SEEK IMMEDIATE MEDICAL CARE IF YOU HAVE ANY OF THE FOLLOWING SYMPTOMS: high fevers, lightheadedness/dizziness, chest pain, black or bloody stools, shortness of breath, severe abdominal or back pain, or any signs of dehydration.

## 2024-10-15 NOTE — ED ADULT NURSE NOTE - PAIN: PRESENCE, MLM
Group Topic:  RICARDO Process Group    Date: 6/21/2020  Start Time: 1345  End Time: 1430  Facilitators: Vianey Arriaga LPC    Focus: check out   Number in attendance: 8      Handouts: recovery preparedness   Method: Group  Attendance: Present  Mood/Affect: Appropriate  Behavior/Socialization: Appropriate to group   Participation: Active  Overall Patient Response to Group: Appropriate to topic  Individual Response to Group: Pt appeared to be a little tearful during check out group stating, \"today is a hard day for me\". Pt plans on watching a motivational video this evening and engaging in a game with peers.  Ability to Apply Content to Group: 5  Vianey Arriaga LPC, [facility]       denies pain/discomfort (Rating = 0)

## 2024-10-15 NOTE — ED ADULT TRIAGE NOTE - CHIEF COMPLAINT QUOTE
Presented to ED c/o generalized weakness and diarrhea x2weeks and fall OOB because knees buckled this AM. Denies HT, LOC, and AC use.

## 2024-10-15 NOTE — ED PROVIDER NOTE - OBJECTIVE STATEMENT
patient is a 53yo female PMH breast cancer s/p mastectomy in remission, hypothyroid, htn, diverticulosis coming to ED for diarrhea, weakness, abdominal pain. pt reports x2 weeks of intermittent diarrhea, notes multiple episodes watery non bloody diarrhea for approx 2-3 days that resolves and then returns. has had b/l low back/ flank pain x5 days, now radiates to b/l low abdomen. pt reports feeling weak today, had mechanical fall due to legs feeling weak, was able to get up and walk after fall. denies headache, fever, vomiting, constipation, chest pain, SOB, cough, unilateral numbness/ weakness, paresthesias.

## 2024-10-15 NOTE — ED PROVIDER NOTE - PHYSICAL EXAMINATION
CONST: Well appearing in NAD  EYES: EOMI, Sclera and conjunctiva clear.   ENT: No nasal discharge.   NECK: Non-tender, no meningeal signs  CARD: Normal S1 S2; Normal rate and rhythm  RESP: Equal BS B/L, No wheezes, rhonchi or rales. No distress  GI: Soft, non-distended, TTP b/l low abdomen and suprapubic area  MS: Normal ROM in all extremities. No midline spinal tenderness.  SKIN: Warm, dry, no acute rashes. Good turgor  NEURO: A&Ox3, No focal deficits. Strength 5/5 with no sensory deficits. Steady gait

## 2024-10-15 NOTE — ED PROVIDER NOTE - CARE PROVIDER_API CALL
your primary doctor,   Phone: (   )    -  Fax: (   )    -  Follow Up Time: 1-3 Days    your oncologist,   Phone: (   )    -  Fax: (   )    -  Follow Up Time: 1-3 Days

## 2024-10-16 VITALS
HEART RATE: 64 BPM | TEMPERATURE: 98 F | DIASTOLIC BLOOD PRESSURE: 77 MMHG | OXYGEN SATURATION: 99 % | RESPIRATION RATE: 18 BRPM | SYSTOLIC BLOOD PRESSURE: 127 MMHG

## 2024-10-16 LAB
APPEARANCE UR: CLEAR — SIGNIFICANT CHANGE UP
BILIRUB UR-MCNC: NEGATIVE — SIGNIFICANT CHANGE UP
COLOR SPEC: SIGNIFICANT CHANGE UP
DIFF PNL FLD: NEGATIVE — SIGNIFICANT CHANGE UP
GLUCOSE UR QL: NEGATIVE MG/DL — SIGNIFICANT CHANGE UP
KETONES UR-MCNC: ABNORMAL MG/DL
LEUKOCYTE ESTERASE UR-ACNC: NEGATIVE — SIGNIFICANT CHANGE UP
NITRITE UR-MCNC: NEGATIVE — SIGNIFICANT CHANGE UP
PH UR: 5.5 — SIGNIFICANT CHANGE UP (ref 5–8)
PROT UR-MCNC: SIGNIFICANT CHANGE UP MG/DL
SP GR SPEC: >1.03 — HIGH (ref 1–1.03)
UROBILINOGEN FLD QL: 0.2 MG/DL — SIGNIFICANT CHANGE UP (ref 0.2–1)

## 2024-10-21 LAB
CULTURE RESULTS: SIGNIFICANT CHANGE UP
CULTURE RESULTS: SIGNIFICANT CHANGE UP
SPECIMEN SOURCE: SIGNIFICANT CHANGE UP
SPECIMEN SOURCE: SIGNIFICANT CHANGE UP

## 2024-10-23 NOTE — ASU PATIENT PROFILE, ADULT - FALL HARM RISK TYPE OF ASSESSMENT
Duration Of Freeze Thaw-Cycle (Seconds): 5 Consent: The patient's consent was obtained including but not limited to risks of crusting, scabbing, blistering, scarring, darker or lighter pigmentary change, recurrence, incomplete removal and infection. Detail Level: Detailed Number Of Freeze-Thaw Cycles: 1 freeze-thaw cycle Medical Necessity Information: It is in your best interest to select a reason for this procedure from the list below. All of these items fulfill various CMS LCD requirements except the new and changing color options. Treatment Number (Will Not Render If 0): 0 Include Z78.9 (Other Specified Conditions Influencing Health Status) As An Associated Diagnosis?: No Anesthesia Volume In Cc: 0.5 Post-Care Instructions: I reviewed with the patient in detail post-care instructions. Patient is to wear sunprotection, and avoid picking at any of the treated lesions. Pt may apply Vaseline to crusted or scabbing areas. Medical Necessity Clause: This procedure was medically necessary because the lesions that were treated were: Admission

## 2025-01-02 ENCOUNTER — NON-APPOINTMENT (OUTPATIENT)
Age: 55
End: 2025-01-02

## 2025-05-16 NOTE — ED ADULT NURSE NOTE - NS ED NOTE ABUSE SUSPICION NEGLECT YN
Kady Hope is a 71 y.o. year old female here for:    Chief Complaint:    Chief Complaint   Patient presents with    Follow-up    Feeling \"Off\"/Malaise     Subjective:    Today, her current concerns include:    HPI:  #Feeling off/Malaise  - Onset: She had not eaten all day, her watch starting flagging tachycardia and she started feeling off after she did go to dinner. She went to ED to get evaluated when she did not feel back to baseline. In ED, ECG with RBBB with leukocytosis and neutrophil elevation on CBC, no left shift. She lives with lots of stress. No troponin in ED or IVF.  - Progression: Improved now  - Modifiers: Mostly back to baseline on its own  - Associated Symptoms: Per ROS as otherwise stated in this note    Past Medical History:   Diagnosis Date    Abnormal partial thromboplastin time (PTT) 2024    Allergic rhinitis     Cellulitis of right lower extremity 2024    Chronic back pain 2016    Elevated hemoglobin A1c 2024    Hidradenitis suppurativa     Hyperlipidemia     Hypertension     Leukocytosis     Obesity     ISABELA (obstructive sleep apnea)     uses CPAP nightly    Spondylosis of lumbar region without myelopathy or radiculopathy 2016     Social History     Tobacco Use    Smoking status: Never    Smokeless tobacco: Never   Vaping Use    Vaping status: Never Used   Substance Use Topics    Alcohol use: No    Drug use: No     Family History   Problem Relation Age of Onset    Heart Disease Father     Breast Cancer Neg Hx     Colon Cancer Neg Hx      Past Surgical History:   Procedure Laterality Date    ANKLE SURGERY      right-broken bones    BREAST BIOPSY      benign     SECTION  ,     baby head too large, heart rate dropped    OTHER SURGICAL HISTORY  1996    sweat glands infected       Current Outpatient Medications:     hydroCHLOROthiazide (HYDRODIURIL) 25 MG tablet, Take 1 tablet by mouth once daily, Disp: 90 tablet, Rfl: 0     No